# Patient Record
Sex: MALE | Race: OTHER | URBAN - METROPOLITAN AREA
[De-identification: names, ages, dates, MRNs, and addresses within clinical notes are randomized per-mention and may not be internally consistent; named-entity substitution may affect disease eponyms.]

---

## 2018-08-01 LAB
AVERAGE GLUCOSE: 120
CHOLESTEROL, TOTAL: 130 MG/DL
CHOLESTEROL/HDL RATIO: 3.6
HBA1C MFR BLD: 5.8 %
HDLC SERPL-MCNC: 36 MG/DL (ref 35–70)
LDL CHOLESTEROL CALCULATED: 74 MG/DL (ref 0–160)
TRIGL SERPL-MCNC: 99 MG/DL
VLDLC SERPL CALC-MCNC: 20 MG/DL

## 2018-08-02 LAB
ALBUMIN SERPL-MCNC: 4.2 G/DL
ALP BLD-CCNC: 104 U/L
ALT SERPL-CCNC: 11 U/L
ANION GAP SERPL CALCULATED.3IONS-SCNC: 13 MMOL/L
AST SERPL-CCNC: 17 U/L
BILIRUB SERPL-MCNC: 1.1 MG/DL (ref 0.1–1.4)
BUN BLDV-MCNC: 27 MG/DL
CALCIUM SERPL-MCNC: 9.6 MG/DL
CHLORIDE BLD-SCNC: 91 MMOL/L
CO2: NORMAL MMOL/L
CREAT SERPL-MCNC: 0.82 MG/DL
GFR CALCULATED: >60
GLUCOSE BLD-MCNC: 100 MG/DL
POTASSIUM SERPL-SCNC: 4.1 MMOL/L
SODIUM BLD-SCNC: 137 MMOL/L
TOTAL PROTEIN: 7.7

## 2018-08-06 ENCOUNTER — TELEPHONE (OUTPATIENT)
Dept: FAMILY MEDICINE CLINIC | Age: 58
End: 2018-08-06

## 2018-08-06 NOTE — TELEPHONE ENCOUNTER
PT WAS REFERRED BY [facility]-- [facility]       PT WAS HOSPITALIZED FOR 10 DAYS  WAS RELEASED 08/03/18 AND WOULD LIKE TO EST CARE WITH [facility] HE WOULD NEED TO BE SEEN IN 2 WKS IF ACCEPTED BY [facility] AS NEW PT  . PT HAS PULMONARY AND HEART ISSUES.         [facility]: [phone]

## 2018-08-06 NOTE — TELEPHONE ENCOUNTER
I HAVE NO IDEA WHY PATIENT WAS HOSPITALIZED AND AM UNABLE TO FIND RECORDS ON PATIENT OR ACCESS CARE EVERYWHERE SINCE ALL OF HIS INFORMATION IS IN HIS CHART. PATIENT REQUESTING APPT WITHIN 2 WEEKS TO ESTABLISH CARE AND ALSO FOR HOSPITAL FOLLOW UP.  PLEASE ADVISE IF OK TO SCHEDULE AND IF SO WHEN??

## 2018-08-28 ENCOUNTER — OFFICE VISIT (OUTPATIENT)
Dept: FAMILY MEDICINE CLINIC | Age: 58
End: 2018-08-28
Payer: COMMERCIAL

## 2018-08-28 VITALS
BODY MASS INDEX: 35.85 KG/M2 | DIASTOLIC BLOOD PRESSURE: 74 MMHG | WEIGHT: 270.5 LBS | HEART RATE: 72 BPM | RESPIRATION RATE: 16 BRPM | HEIGHT: 73 IN | TEMPERATURE: 97.7 F | SYSTOLIC BLOOD PRESSURE: 118 MMHG

## 2018-08-28 DIAGNOSIS — G47.33 OBSTRUCTIVE SLEEP APNEA: ICD-10-CM

## 2018-08-28 DIAGNOSIS — I27.20 PULMONARY HTN (HCC): ICD-10-CM

## 2018-08-28 DIAGNOSIS — J44.9 CHRONIC OBSTRUCTIVE PULMONARY DISEASE, UNSPECIFIED COPD TYPE (HCC): ICD-10-CM

## 2018-08-28 DIAGNOSIS — E78.2 MIXED HYPERLIPIDEMIA: ICD-10-CM

## 2018-08-28 DIAGNOSIS — I10 ESSENTIAL HYPERTENSION: Primary | ICD-10-CM

## 2018-08-28 DIAGNOSIS — Z11.59 NEED FOR HEPATITIS C SCREENING TEST: ICD-10-CM

## 2018-08-28 PROCEDURE — 1036F TOBACCO NON-USER: CPT | Performed by: FAMILY MEDICINE

## 2018-08-28 PROCEDURE — G8598 ASA/ANTIPLAT THER USED: HCPCS | Performed by: FAMILY MEDICINE

## 2018-08-28 PROCEDURE — 3017F COLORECTAL CA SCREEN DOC REV: CPT | Performed by: FAMILY MEDICINE

## 2018-08-28 PROCEDURE — G8417 CALC BMI ABV UP PARAM F/U: HCPCS | Performed by: FAMILY MEDICINE

## 2018-08-28 PROCEDURE — G8427 DOCREV CUR MEDS BY ELIG CLIN: HCPCS | Performed by: FAMILY MEDICINE

## 2018-08-28 PROCEDURE — 99203 OFFICE O/P NEW LOW 30 MIN: CPT | Performed by: FAMILY MEDICINE

## 2018-08-28 PROCEDURE — G8926 SPIRO NO PERF OR DOC: HCPCS | Performed by: FAMILY MEDICINE

## 2018-08-28 PROCEDURE — 3023F SPIROM DOC REV: CPT | Performed by: FAMILY MEDICINE

## 2018-08-28 RX ORDER — FUROSEMIDE 40 MG/1
40 TABLET ORAL DAILY
Qty: 90 TABLET | Refills: 1 | Status: SHIPPED | OUTPATIENT
Start: 2018-08-28 | End: 2019-02-27 | Stop reason: SDUPTHER

## 2018-08-28 RX ORDER — FUROSEMIDE 40 MG/1
40 TABLET ORAL DAILY
COMMUNITY
End: 2018-08-28 | Stop reason: SDUPTHER

## 2018-08-28 RX ORDER — CARVEDILOL 6.25 MG/1
6.25 TABLET ORAL 2 TIMES DAILY WITH MEALS
COMMUNITY

## 2018-08-28 RX ORDER — ALBUTEROL SULFATE 90 UG/1
2 AEROSOL, METERED RESPIRATORY (INHALATION) EVERY 6 HOURS PRN
Qty: 3 INHALER | Refills: 1 | Status: SHIPPED | OUTPATIENT
Start: 2018-08-28

## 2018-08-28 RX ORDER — ATORVASTATIN CALCIUM 80 MG/1
80 TABLET, FILM COATED ORAL EVERY EVENING
COMMUNITY

## 2018-08-28 RX ORDER — ALBUTEROL SULFATE 90 UG/1
2 AEROSOL, METERED RESPIRATORY (INHALATION) EVERY 6 HOURS PRN
COMMUNITY
End: 2018-08-28 | Stop reason: SDUPTHER

## 2018-08-28 ASSESSMENT — PATIENT HEALTH QUESTIONNAIRE - PHQ9
SUM OF ALL RESPONSES TO PHQ9 QUESTIONS 1 & 2: 0
1. LITTLE INTEREST OR PLEASURE IN DOING THINGS: 0
SUM OF ALL RESPONSES TO PHQ QUESTIONS 1-9: 0
SUM OF ALL RESPONSES TO PHQ QUESTIONS 1-9: 0
2. FEELING DOWN, DEPRESSED OR HOPELESS: 0

## 2018-08-28 NOTE — PROGRESS NOTES
Chief Complaint   Patient presents with   [facility].     new pt here to establish care     previous pcp dr salgado    Follow-Up from Hospital     f/u sj 7/25-7/30/18 and  7/30-8/3/18 CHF, copd, pulmonary htn    Testiclular Problem     c/o lump on right testicle for past 5 months      Patient presents today as a new patient here to establish care. Patients previous PCP was Dr. Melissa Pritchett    Patient also presents today for FU on recent hospitalization for acute respiratory failure secondary to CHF, COPD, and Pulmonary Hypertension at [facility] 7/25/18-7/30/18 and than he was transferred to [facility] 7/30-8/3/18. his medication was adjusted during the stay. He was seen by Dr The Mosaic Jules, Dr. Payal Goodwin, Dr. Park Clark, Dr. Chiara Ellis  he reports that condition has resolved  Cont symptoms include none  Lab and imaging test obtained include blood tests, CT scan, echocardiogram, x-rays  All test results were obtained and reviewed today. He was discharged home on home oxygen. He was also discharged home on BiPAP which he has been tolerating well at home. His symptoms have improved since he was discharged with improvement in his dyspnea and minimal coughing and no chest pain. No PND or orthopnea. He has not been coughing and has wheezes occasionally. He also presents c/o of Right  testicle mass. Symptoms began 5  months ago. Onset was sudden and states it has been increasing in size Pain is not present. Trauma to the area has not occured.     Past Medical History:   Diagnosis Date    Asthma     CAD (coronary artery disease)     CHF (congestive heart failure) (HCC)     Colon polyp     COPD (chronic obstructive pulmonary disease) (HCC)     Diverticulosis of sigmoid colon     Gastric ulcer with hemorrhage     Heart attack ([address]     HTN (hypertension)     Hyperlipidemia     Obstructive sleep apnea     uses bi-pap    [facility]monary HTN ([facility] [address] [facility]      Patient Active Problem List    Diagnosis Date Noted    COPD (chronic obstructive the lungs every 6 hours as needed for Wheezing 3 Inhaler 1    tiotropium (SPIRIVA HANDIHALER) 18 MCG inhalation capsule Inhale 1 capsule into the lungs daily 90 capsule 1    [DISCONTINUED] furosemide (LASIX) 40 MG tablet Take 40 mg by mouth daily      [DISCONTINUED] albuterol sulfate HFA (PROAIR HFA) 108 (90 Base) MCG/ACT inhaler Inhale 2 puffs into the lungs every 6 hours as needed for Wheezing       No facility-administered encounter medications on file as of 8/28/2018. Orders Placed This Encounter   Procedures    Comprehensive Metabolic Panel     Standing Status:   Future     Standing Expiration Date:   8/28/2019    Lipid Panel     Standing Status:   Future     Standing Expiration Date:   8/28/2019     Order Specific Question:   Is Patient Fasting?/# of Hours     Answer:   8-10HRS    CBC Auto Differential     Standing Status:   Future     Standing Expiration Date:   8/28/2019    Hepatitis C Antibody     Standing Status:   Future     Standing Expiration Date:   8/28/2019    Lipid Panel     This order was created through External Result Entry    Comprehensive Metabolic Panel     This order was created through External Result Entry    Hemoglobin A1C     This order was created through External Result Entry       Natural history and expected course discussed. Questions answered. Educational materials distributed. They understand and agree with this course of treatment. They will return with new or worsening symptoms. Patient instructed to remain current with appropriate annual health maintenance.    Follow up if persistent or worsening symptoms otherwise follow up after test result and prn

## 2018-08-28 NOTE — PATIENT INSTRUCTIONS
· Eat foods that contain protein so that you do not lose muscle mass.     · Talk with your doctor if you gain too much weight or if you lose weight without trying.    Mental health    · Talk to your family, friends, or a therapist about your feelings. It is normal to feel frightened, angry, hopeless, helpless, and even guilty. Talking openly about bad feelings can help you cope. If these feelings last, talk to your doctor. When should you call for help? Call 911 anytime you think you may need emergency care. For example, call if:    · You have severe trouble breathing.    Call your doctor now or seek immediate medical care if:    · You have new or worse trouble breathing.     · You cough up blood.     · You have a fever.    Watch closely for changes in your health, and be sure to contact your doctor if:    · You cough more deeply or more often, especially if you notice more mucus or a change in the color of your mucus.     · You have new or worse swelling in your legs or belly.     · You are not getting better as expected. Where can you learn more? Go to [email or web address] org and sign in to your [employer] account. Enter J323 in the xMatters box to learn more about \"Chronic Obstructive Pulmonary Disease (COPD): Care Instructions. \"     If you do not have an account, please click on the \"Sign Up Now\" link. Current as of: December 6, 2017  Content Version: 11.7  © 6835-4262 [employer]. Care instructions adapted under license by [facility] ([facility]). If you have questions about a medical condition or this instruction, always ask your healthcare professional. [address] any warranty or liability for your use of this information.

## 2018-08-28 NOTE — LETTER
[facility]  [address] [address]  Phone: [phone]  Fax: [phone]    Sue Galindo MD        2018     Patient: Jose Adkins   YOB: 1960   Date of Visit: 2018       To Whom It May Concern: It is my medical opinion that Jose Adkins requires a disability parking placard for the following reasons:  He cannot walk 200 feet without stopping to rest.  Duration of need: 5 years to  on 23    If you have any questions or concerns, please don't hesitate to call.     Sincerely,        Sue Galindo MD

## 2018-08-29 DIAGNOSIS — E78.2 MIXED HYPERLIPIDEMIA: ICD-10-CM

## 2018-08-29 DIAGNOSIS — Z11.59 NEED FOR HEPATITIS C SCREENING TEST: ICD-10-CM

## 2018-08-29 DIAGNOSIS — I10 ESSENTIAL HYPERTENSION: ICD-10-CM

## 2018-08-29 LAB
ALBUMIN SERPL-MCNC: 4.3 G/DL (ref 3.9–4.9)
ALP BLD-CCNC: 107 U/L (ref 35–104)
ALT SERPL-CCNC: 15 U/L (ref 0–41)
ANION GAP SERPL CALCULATED.3IONS-SCNC: 16 MEQ/L (ref 7–13)
AST SERPL-CCNC: 22 U/L (ref 0–40)
BASOPHILS ABSOLUTE: 0.1 K/UL (ref 0–0.2)
BASOPHILS RELATIVE PERCENT: 0.9 %
BILIRUB SERPL-MCNC: 0.8 MG/DL (ref 0–1.2)
BUN BLDV-MCNC: 12 MG/DL (ref 6–20)
CALCIUM SERPL-MCNC: 9.4 MG/DL (ref 8.6–10.2)
CHLORIDE BLD-SCNC: 94 MEQ/L (ref 98–107)
CHOLESTEROL, TOTAL: 149 MG/DL (ref 0–199)
CO2: 32 MEQ/L (ref 22–29)
CREAT SERPL-MCNC: 0.7 MG/DL (ref 0.7–1.2)
EOSINOPHILS ABSOLUTE: 0.6 K/UL (ref 0–0.7)
EOSINOPHILS RELATIVE PERCENT: 6.8 %
GFR AFRICAN AMERICAN: >60
GFR NON-AFRICAN AMERICAN: >60
GLOBULIN: 3.2 G/DL (ref 2.3–3.5)
GLUCOSE BLD-MCNC: 101 MG/DL (ref 74–109)
HCT VFR BLD CALC: 47.4 % (ref 42–52)
HDLC SERPL-MCNC: 41 MG/DL (ref 40–59)
HEMOGLOBIN: 15.7 G/DL (ref 14–18)
HEPATITIS C ANTIBODY INTERPRETATION: NORMAL
LDL CHOLESTEROL CALCULATED: 83 MG/DL (ref 0–129)
LYMPHOCYTES ABSOLUTE: 1.9 K/UL (ref 1–4.8)
LYMPHOCYTES RELATIVE PERCENT: 24.1 %
MCH RBC QN AUTO: 28.5 PG (ref 27–31.3)
MCHC RBC AUTO-ENTMCNC: 33.2 % (ref 33–37)
MCV RBC AUTO: 85.9 FL (ref 80–100)
MONOCYTES ABSOLUTE: 0.6 K/UL (ref 0.2–0.8)
MONOCYTES RELATIVE PERCENT: 7.8 %
NEUTROPHILS ABSOLUTE: 4.9 K/UL (ref 1.4–6.5)
NEUTROPHILS RELATIVE PERCENT: 60.4 %
PDW BLD-RTO: 14.7 % (ref 11.5–14.5)
PLATELET # BLD: 172 K/UL (ref 130–400)
POTASSIUM SERPL-SCNC: 3.6 MEQ/L (ref 3.5–5.1)
RBC # BLD: 5.52 M/UL (ref 4.7–6.1)
SODIUM BLD-SCNC: 142 MEQ/L (ref 132–144)
TOTAL PROTEIN: 7.5 G/DL (ref 6.4–8.1)
TRIGL SERPL-MCNC: 127 MG/DL (ref 0–200)
WBC # BLD: 8.1 K/UL (ref 4.8–10.8)

## 2018-10-05 ENCOUNTER — OFFICE VISIT (OUTPATIENT)
Dept: FAMILY MEDICINE CLINIC | Age: 58
End: 2018-10-05
Payer: COMMERCIAL

## 2018-10-05 VITALS
HEART RATE: 80 BPM | BODY MASS INDEX: 35.41 KG/M2 | SYSTOLIC BLOOD PRESSURE: 118 MMHG | RESPIRATION RATE: 20 BRPM | DIASTOLIC BLOOD PRESSURE: 60 MMHG | HEIGHT: 73 IN | WEIGHT: 267.2 LBS | TEMPERATURE: 97.5 F

## 2018-10-05 DIAGNOSIS — L72.3 SEBACEOUS CYST OF SCROTUM: ICD-10-CM

## 2018-10-05 DIAGNOSIS — J44.9 CHRONIC OBSTRUCTIVE PULMONARY DISEASE, UNSPECIFIED COPD TYPE (HCC): ICD-10-CM

## 2018-10-05 DIAGNOSIS — R20.8 OTHER DISTURBANCES OF SKIN SENSATION: ICD-10-CM

## 2018-10-05 DIAGNOSIS — Z23 NEED FOR INFLUENZA VACCINATION: ICD-10-CM

## 2018-10-05 DIAGNOSIS — G47.33 OSA TREATED WITH BIPAP: ICD-10-CM

## 2018-10-05 DIAGNOSIS — Z23 NEED FOR STREPTOCOCCUS PNEUMONIAE VACCINATION: ICD-10-CM

## 2018-10-05 DIAGNOSIS — I10 ESSENTIAL HYPERTENSION: Primary | ICD-10-CM

## 2018-10-05 PROCEDURE — 90688 IIV4 VACCINE SPLT 0.5 ML IM: CPT | Performed by: FAMILY MEDICINE

## 2018-10-05 PROCEDURE — 90472 IMMUNIZATION ADMIN EACH ADD: CPT | Performed by: FAMILY MEDICINE

## 2018-10-05 PROCEDURE — G8427 DOCREV CUR MEDS BY ELIG CLIN: HCPCS | Performed by: FAMILY MEDICINE

## 2018-10-05 PROCEDURE — 99213 OFFICE O/P EST LOW 20 MIN: CPT | Performed by: FAMILY MEDICINE

## 2018-10-05 PROCEDURE — 3017F COLORECTAL CA SCREEN DOC REV: CPT | Performed by: FAMILY MEDICINE

## 2018-10-05 PROCEDURE — 11100 PR BIOPSY OF SKIN LESION: CPT | Performed by: FAMILY MEDICINE

## 2018-10-05 PROCEDURE — 90471 IMMUNIZATION ADMIN: CPT | Performed by: FAMILY MEDICINE

## 2018-10-05 PROCEDURE — G8598 ASA/ANTIPLAT THER USED: HCPCS | Performed by: FAMILY MEDICINE

## 2018-10-05 PROCEDURE — 90732 PPSV23 VACC 2 YRS+ SUBQ/IM: CPT | Performed by: FAMILY MEDICINE

## 2018-10-05 PROCEDURE — 3023F SPIROM DOC REV: CPT | Performed by: FAMILY MEDICINE

## 2018-10-05 PROCEDURE — G8482 FLU IMMUNIZE ORDER/ADMIN: HCPCS | Performed by: FAMILY MEDICINE

## 2018-10-05 PROCEDURE — 1036F TOBACCO NON-USER: CPT | Performed by: FAMILY MEDICINE

## 2018-10-05 PROCEDURE — G8417 CALC BMI ABV UP PARAM F/U: HCPCS | Performed by: FAMILY MEDICINE

## 2018-10-05 PROCEDURE — G8926 SPIRO NO PERF OR DOC: HCPCS | Performed by: FAMILY MEDICINE

## 2018-10-05 RX ORDER — POTASSIUM CHLORIDE 750 MG/1
10 TABLET, FILM COATED, EXTENDED RELEASE ORAL DAILY
Qty: 90 TABLET | Refills: 1 | Status: SHIPPED | OUTPATIENT
Start: 2018-10-05

## 2018-10-05 NOTE — PROGRESS NOTES
Subjective:      Amie Tovar is a 60 y.o. male who presents today for FU of Mass (C/O SMALL LUMP ON RIGHT TESTICLE FOR PAST YEAR) and Follow-up (Chronic medical conditions)    He is here to follow up on his prior visit on his chronic medical condition. He reports that he had seen the specialist who informed him that he did not have pulmonary hypertension. He has since been following up with this regular pulmonologist and cardiologist.  He has felt a lot better since he has been on BiPAP for his sleep apnea. His symptoms have completely resolved and is able to function better. The patient denies cough, chest pain, dyspnea, wheezing or hemoptysis. Patient denies any exertional chest pain, dyspnea, palpitations, syncope, orthopnea, edema or paroxysmal nocturnal dyspnea. He presents c/o of Right testicle mass. Symptoms began 1 year ago. It has been constant and has increased in size Pain is not present. . Associated symptoms include none. Trauma to the area has not occured.     Past Medical History:   Diagnosis Date    Asthma     CAD (coronary artery disease)     CHF (congestive heart failure) (HCC)     Colon polyp     COPD (chronic obstructive pulmonary disease) (HCC)     Diverticulosis of sigmoid colon     Gastric ulcer with hemorrhage     Heart attack ([address]     HTN (hypertension)     Hyperlipidemia     Obstructive sleep apnea     uses bi-pap    Pulmonary HTN [address] [address]      Patient Active Problem List    Diagnosis Date Noted    COPD (chronic obstructive pulmonary disease) ([facility] [address]     CHF (congestive heart failure) (HCC)     Asthma     MARIAN treated with BiPAP     HTN (hypertension)     Hyperlipidemia     CAD (coronary artery disease)      Past Surgical History:   Procedure Laterality Date    COLONOSCOPY  2017    Dr. Lance Pires  2013    1 stent    CORONARY ANGIOPLASTY WITH STENT PLACEMENT  2014    2 stents    CYST REMOVAL  2014    back    CYST REMOVAL MG tablet Take 80 mg by mouth every evening      furosemide (LASIX) 40 MG tablet Take 1 tablet by mouth daily 90 tablet 1    albuterol sulfate HFA (PROAIR HFA) 108 (90 Base) MCG/ACT inhaler Inhale 2 puffs into the lungs every 6 hours as needed for Wheezing 3 Inhaler 1    tiotropium (SPIRIVA HANDIHALER) 18 MCG inhalation capsule Inhale 1 capsule into the lungs daily 90 capsule 1    OXYGEN 2l continuous oxygen       No current facility-administered medications on file prior to visit. No Known Allergies     Review of Systems - General ROS: negative for - fatigue, fever, malaise, night sweats, sleep disturbance or weight loss  Psychological ROS: negative for - anxiety, concentration difficulties, depression, memory difficulties or sleep disturbances  ENT ROS: negative for - hearing change, nasal discharge, oral lesions, sinus pain, sore throat, tinnitus or vertigo  Allergy and Immunology ROS: negative for - hives, nasal congestion or seasonal allergies  Hematological and Lymphatic ROS: negative for - bruising, fatigue, night sweats or pallor  Endocrine ROS: negative for - hot flashes, malaise/lethargy, palpitations, polydipsia/polyuria, skin changes, temperature intolerance or unexpected weight changes  Respiratory ROS: negative for - cough, hemoptysis, pleuritic pain, shortness of breath or wheezing  Cardiovascular ROS: no chest pain or dyspnea on exertion  Gastrointestinal ROS: no abdominal pain, change in bowel habits, or black or bloody stools  Genito-Urinary ROS: no dysuria, trouble voiding, or hematuria  Musculoskeletal ROS: negative for - joint pain, joint stiffness, joint swelling, muscle pain or muscular weakness  Neurological ROS: negative for - dizziness, gait disturbance, headaches, impaired coordination/balance, numbness/tingling, tremors or visual changes       Blood pressure 118/60, pulse 80, temperature 97.5 °F (36.4 °C), temperature source Temporal, resp.  rate 20, height 6' 1\" (1.854 m), weight 1.2 mg/dL Final    Alkaline Phosphatase 08/29/2018 107* 35 - 104 U/L Final    ALT 08/29/2018 15  0 - 41 U/L Final    AST 08/29/2018 22  0 - 40 U/L Final    Globulin 08/29/2018 3.2  2.3 - 3.5 g/dL Final    Cholesterol, Total 08/29/2018 149  0 - 199 mg/dL Final    ATP III Cholesterol classification is Desirable.  Triglycerides 08/29/2018 127  0 - 200 mg/dL Final    ATP III Triglycerides Classification is Normal.    HDL 08/29/2018 41  40 - 59 mg/dL Final    Comment: ATP III HDL Cholesterol Classification is Desirable. Expected Values:    Males:    >55 = No Risk            35-55 = Moderate Risk            <35 = High Risk    Females:  >65 = No Risk            45-65 = Moderate Risk            <45 = High Risk    NCEP Guidelines:   Third Report May 2001  >59 = negative risk factor for CHD  <40 = major risk factor for CHD      LDL Calculated 08/29/2018 83  0 - 129 mg/dL Final    ATP III LDL Classification is Optimal.    WBC 08/29/2018 8.1  4.8 - 10.8 K/uL Final    RBC 08/29/2018 5.52  4.70 - 6.10 M/uL Final    Hemoglobin 08/29/2018 15.7  14.0 - 18.0 g/dL Final    Hematocrit 08/29/2018 47.4  42.0 - 52.0 % Final    MCV 08/29/2018 85.9  80.0 - 100.0 fL Final    MCH 08/29/2018 28.5  27.0 - 31.3 pg Final    MCHC 08/29/2018 33.2  33.0 - 37.0 % Final    RDW 08/29/2018 14.7* 11.5 - 14.5 % Final    Platelets 90/70/1283 172  130 - 400 K/uL Final    Neutrophils % 08/29/2018 60.4  % Final    Lymphocytes % 08/29/2018 24.1  % Final    Monocytes % 08/29/2018 7.8  % Final    Eosinophils % 08/29/2018 6.8  % Final    Basophils % 08/29/2018 0.9  % Final    Neutrophils # 08/29/2018 4.9  1.4 - 6.5 K/uL Final    Lymphocytes # 08/29/2018 1.9  1.0 - 4.8 K/uL Final    Monocytes # 08/29/2018 0.6  0.2 - 0.8 K/uL Final    Eosinophils # 08/29/2018 0.6  0.0 - 0.7 K/uL Final    Basophils # 08/29/2018 0.1  0.0 - 0.2 K/uL Final    Hep C Ab Interp 08/29/2018 Non-reactive   Final       Test results reviewed and discussed with the patient     Assessment / Plan:     Encounter Diagnoses   Name Primary?  Need for influenza vaccination     MARIAN treated with BiPAP     Essential hypertension Yes    Chronic obstructive pulmonary disease, unspecified COPD type ([facility] [address]     Need for Streptococcus pneumoniae vaccination     Sebaceous cyst of scrotum     Other disturbances of skin sensation        Outpatient Encounter Prescriptions as of 10/5/2018   Medication Sig Dispense Refill    potassium chloride (K-TAB) 10 MEQ extended release tablet Take 1 tablet by mouth daily 90 tablet 1    mometasone-formoterol (DULERA) 100-5 MCG/ACT inhaler Inhale 2 puffs into the lungs 2 times daily      aspirin 81 MG tablet Take 81 mg by mouth daily      carvedilol (COREG) 6.25 MG tablet Take 6.25 mg by mouth 2 times daily (with meals)      atorvastatin (LIPITOR) 80 MG tablet Take 80 mg by mouth every evening      furosemide (LASIX) 40 MG tablet Take 1 tablet by mouth daily 90 tablet 1    albuterol sulfate HFA (PROAIR HFA) 108 (90 Base) MCG/ACT inhaler Inhale 2 puffs into the lungs every 6 hours as needed for Wheezing 3 Inhaler 1    tiotropium (SPIRIVA HANDIHALER) 18 MCG inhalation capsule Inhale 1 capsule into the lungs daily 90 capsule 1    OXYGEN 2l continuous oxygen      [DISCONTINUED] tiotropium (SPIRIVA HANDIHALER) 18 MCG inhalation capsule Inhale 18 mcg into the lungs daily       No facility-administered encounter medications on file as of 10/5/2018.         Orders Placed This Encounter   Procedures    INFLUENZA, QUADV, 3 YRS AND OLDER, IM, MDV, 0.5ML (FLUZONE QUADV)    Pneumococcal polysaccharide vaccine 23-valent greater than or equal to 1yo subcutaneous/IM     [id number]    Surgical Pathology     Standing Status:   Future     Standing Expiration Date:   10/5/2019     Order Specific Question:   PREVIOUS BIOPSY     Answer:   No     Order Specific Question:   PREOP DIAGNOSIS     Answer:   epidermoid cyst scrotum     Order Specific Question:   FROZEN

## 2018-10-06 DIAGNOSIS — L72.3 SEBACEOUS CYST OF SCROTUM: ICD-10-CM

## 2018-10-11 ENCOUNTER — NURSE ONLY (OUTPATIENT)
Dept: FAMILY MEDICINE CLINIC | Age: 58
End: 2018-10-11

## 2018-10-11 DIAGNOSIS — L72.3 SEBACEOUS CYST OF SCROTUM: Primary | ICD-10-CM

## 2018-12-01 RX ORDER — FUROSEMIDE 40 MG/1
40 TABLET ORAL DAILY
Qty: 90 TABLET | Refills: 1 | OUTPATIENT
Start: 2018-12-01

## 2018-12-04 NOTE — TELEPHONE ENCOUNTER
From: Froy Smalls  Sent: 12/4/2018 7:02 AM EST  Subject: Medication Renewal Request    Froy Smalls would like a refill of the following medications:     tiotropium (SPIRIVA RESPIMAT) 1.25 MCG/ACT AERS inhaler Tim Chris MD]    Preferred pharmacy: [address] [place] [address] , [address] [facility] [phone] Rico Benitez [phone]    Comment:

## 2019-03-19 ENCOUNTER — TELEPHONE (OUTPATIENT)
Dept: ADMINISTRATIVE | Age: 59
End: 2019-03-19

## 2023-03-07 ENCOUNTER — OFFICE VISIT (OUTPATIENT)
Dept: PRIMARY CARE | Facility: CLINIC | Age: 63
End: 2023-03-07
Payer: COMMERCIAL

## 2023-03-07 VITALS
WEIGHT: 293 LBS | TEMPERATURE: 97.6 F | DIASTOLIC BLOOD PRESSURE: 70 MMHG | BODY MASS INDEX: 39.68 KG/M2 | HEART RATE: 104 BPM | SYSTOLIC BLOOD PRESSURE: 140 MMHG | OXYGEN SATURATION: 94 % | HEIGHT: 72 IN | RESPIRATION RATE: 16 BRPM

## 2023-03-07 DIAGNOSIS — R05.9 COUGH IN ADULT: Primary | ICD-10-CM

## 2023-03-07 DIAGNOSIS — R05.9 COUGH: ICD-10-CM

## 2023-03-07 PROCEDURE — 99213 OFFICE O/P EST LOW 20 MIN: CPT | Performed by: NURSE PRACTITIONER

## 2023-03-07 PROCEDURE — 1036F TOBACCO NON-USER: CPT | Performed by: NURSE PRACTITIONER

## 2023-03-07 RX ORDER — AMOXICILLIN 500 MG/1
500 CAPSULE ORAL 3 TIMES DAILY
Qty: 21 CAPSULE | Refills: 0 | Status: SHIPPED | OUTPATIENT
Start: 2023-03-07 | End: 2023-03-14

## 2023-03-07 RX ORDER — FUROSEMIDE 40 MG/1
1 TABLET ORAL DAILY
COMMUNITY
Start: 2018-09-24 | End: 2023-07-31

## 2023-03-07 RX ORDER — MULTIVIT WITH IRON,MINERALS
TABLET,CHEWABLE ORAL
COMMUNITY
End: 2024-05-14 | Stop reason: ALTCHOICE

## 2023-03-07 RX ORDER — CARVEDILOL 6.25 MG/1
TABLET ORAL
COMMUNITY
Start: 2014-06-07 | End: 2024-02-08 | Stop reason: SDUPTHER

## 2023-03-07 RX ORDER — ATORVASTATIN CALCIUM 80 MG/1
1 TABLET, FILM COATED ORAL DAILY
COMMUNITY
Start: 2013-09-07 | End: 2024-03-18 | Stop reason: SDUPTHER

## 2023-03-07 RX ORDER — NITROGLYCERIN 0.4 MG/1
TABLET SUBLINGUAL
COMMUNITY
Start: 2013-08-21

## 2023-03-07 RX ORDER — MOMETASONE FUROATE AND FORMOTEROL FUMARATE DIHYDRATE 100; 5 UG/1; UG/1
2 AEROSOL RESPIRATORY (INHALATION) 2 TIMES DAILY
COMMUNITY
Start: 2015-02-20 | End: 2024-03-15 | Stop reason: SDUPTHER

## 2023-03-07 RX ORDER — FLUTICASONE PROPIONATE 50 MCG
1 SPRAY, SUSPENSION (ML) NASAL 2 TIMES DAILY
COMMUNITY
End: 2023-09-03

## 2023-03-07 RX ORDER — POTASSIUM CHLORIDE 750 MG/1
1 TABLET, EXTENDED RELEASE ORAL DAILY
COMMUNITY
Start: 2019-04-08 | End: 2023-05-31

## 2023-03-07 RX ORDER — TIOTROPIUM BROMIDE INHALATION SPRAY 1.56 UG/1
SPRAY, METERED RESPIRATORY (INHALATION)
COMMUNITY
Start: 2018-09-24 | End: 2023-05-31

## 2023-03-07 ASSESSMENT — PATIENT HEALTH QUESTIONNAIRE - PHQ9
1. LITTLE INTEREST OR PLEASURE IN DOING THINGS: NOT AT ALL
SUM OF ALL RESPONSES TO PHQ9 QUESTIONS 1 AND 2: 0
2. FEELING DOWN, DEPRESSED OR HOPELESS: NOT AT ALL

## 2023-03-07 ASSESSMENT — ENCOUNTER SYMPTOMS
SORE THROAT: 0
HEADACHES: 1
COUGH: 1

## 2023-03-07 NOTE — PATIENT INSTRUCTIONS
Patient was seen and examined.  Diagnosis, treatment, and possible complications of today's illness discussed and explained to patient.  Patient to take medications associated with this visit.   Advised to increase oral fluid intake as tolerated if no nausea or vomiting.  Patient educated and advised to come back if worsening or persistent symptoms. Patient educated on when to seek urgent/emergent care. Patient was given opportunity to ask questions and denied any at this time.  Patient verbalized understanding and agrees with plan of care.

## 2023-03-07 NOTE — PROGRESS NOTES
Subjective   Patient ID: Simba [facility] is a 62 y.o. male who presents for URI.  Patient presents to office with cough, congestion, post nasal drainage.  Patient reports he has taken tylenol, mucinex.  Patient reports symptoms began yesterday.  No covid test taken.  Patient denies fever, shortness of breath, chest pain, nausea, vomiting nor diarrhea.      URI   This is a new problem. The current episode started yesterday. The problem has been gradually worsening. There has been no fever. Associated symptoms include congestion, coughing, headaches and sneezing. Pertinent negatives include no chest pain or sore throat. He has tried acetaminophen and decongestant for the symptoms. The treatment provided mild relief.       Review of Systems   HENT:  Positive for congestion and sneezing. Negative for sore throat.    Respiratory:  Positive for cough.    Cardiovascular:  Negative for chest pain.   Neurological:  Positive for headaches.   All other systems reviewed and are negative.      /70 (BP Location: Left arm, Patient Position: Sitting)   Pulse 104   Temp 36.4 °C (97.6 °F)   Resp 16   Ht 1.829 m (6')   Wt 133 kg (293 lb)   SpO2 94%   BMI 39.74 kg/m²     Objective   Physical Exam  Vitals and nursing note reviewed.   Constitutional:       Appearance: Normal appearance.   HENT:      Head: Normocephalic and atraumatic.      Right Ear: Tympanic membrane, ear canal and external ear normal.      Left Ear: Tympanic membrane, ear canal and external ear normal.      Nose: Congestion and rhinorrhea present.      Mouth/Throat:      Mouth: Mucous membranes are moist.      Pharynx: Oropharynx is clear.   Eyes:      Extraocular Movements: Extraocular movements intact.      Conjunctiva/sclera: Conjunctivae normal.      Pupils: Pupils are equal, round, and reactive to light.   Cardiovascular:      Rate and Rhythm: Normal rate and regular rhythm.      Pulses: Normal pulses.      Heart sounds: Normal heart sounds.    Pulmonary:      Effort: Pulmonary effort is normal. No respiratory distress.      Breath sounds: No stridor. Rhonchi present.      Comments: Rhonchi RUL cleared with cough.   Chest:      Chest wall: No tenderness.   Musculoskeletal:         General: No swelling, deformity or signs of injury. Normal range of motion.      Cervical back: Normal range of motion and neck supple.   Skin:     General: Skin is warm and dry.      Capillary Refill: Capillary refill takes less than 2 seconds.   Neurological:      General: No focal deficit present.      Mental Status: He is alert and oriented to person, place, and time.   Psychiatric:         Mood and Affect: Mood normal.         Behavior: Behavior normal.         Thought Content: Thought content normal.         Judgment: Judgment normal.         Assessment/Plan   Problem List Items Addressed This Visit    None

## 2023-03-07 NOTE — PROGRESS NOTES
Chief Complaint: Possible URI  Symptoms:  Cough, congestion, yellow phlegm, post nasal drip, hoarseness, rattling in lungs, dull headaches, neck pain, body aches, and fatigued  Length of Symptoms: 2 days   Denies:  OTC medications: Mucinex D, and Tylenol   Related information:

## 2023-05-29 DIAGNOSIS — J45.909 ASTHMA, UNSPECIFIED ASTHMA SEVERITY, UNSPECIFIED WHETHER COMPLICATED, UNSPECIFIED WHETHER PERSISTENT (HHS-HCC): ICD-10-CM

## 2023-05-29 DIAGNOSIS — E87.6 HYPOKALEMIA: ICD-10-CM

## 2023-05-31 PROBLEM — G47.33 OBSTRUCTIVE SLEEP APNEA OF ADULT: Status: ACTIVE | Noted: 2023-05-31

## 2023-05-31 PROBLEM — R42 DIZZINESS: Status: ACTIVE | Noted: 2023-05-31

## 2023-05-31 PROBLEM — I73.9 CLAUDICATION (CMS-HCC): Status: ACTIVE | Noted: 2023-05-31

## 2023-05-31 PROBLEM — I10 ESSENTIAL HYPERTENSION: Status: ACTIVE | Noted: 2023-05-31

## 2023-05-31 PROBLEM — U07.1 COVID-19 VIRUS INFECTION: Status: RESOLVED | Noted: 2023-05-31 | Resolved: 2023-05-31

## 2023-05-31 PROBLEM — N39.0 RECURRENT UTI: Status: RESOLVED | Noted: 2023-05-31 | Resolved: 2023-05-31

## 2023-05-31 PROBLEM — H81.10 BPPV (BENIGN PAROXYSMAL POSITIONAL VERTIGO): Status: ACTIVE | Noted: 2023-05-31

## 2023-05-31 PROBLEM — F17.211 CIGARETTE NICOTINE DEPENDENCE IN REMISSION: Status: ACTIVE | Noted: 2023-05-31

## 2023-05-31 PROBLEM — J45.909 ASTHMA (HHS-HCC): Status: ACTIVE | Noted: 2023-05-31

## 2023-05-31 PROBLEM — J44.9 COPD (CHRONIC OBSTRUCTIVE PULMONARY DISEASE) (MULTI): Status: ACTIVE | Noted: 2023-05-31

## 2023-05-31 PROBLEM — I25.10 ATHEROSCLEROTIC HEART DISEASE OF NATIVE CORONARY ARTERY WITHOUT ANGINA PECTORIS: Status: ACTIVE | Noted: 2023-05-31

## 2023-05-31 PROBLEM — Z98.61 CAD S/P PERCUTANEOUS CORONARY ANGIOPLASTY: Status: ACTIVE | Noted: 2023-05-31

## 2023-05-31 PROBLEM — R07.9 CHEST PAIN: Status: ACTIVE | Noted: 2023-05-31

## 2023-05-31 PROBLEM — M25.562 CHRONIC PAIN OF LEFT KNEE: Status: ACTIVE | Noted: 2023-05-31

## 2023-05-31 PROBLEM — E55.9 VITAMIN D DEFICIENCY: Status: ACTIVE | Noted: 2023-05-31

## 2023-05-31 PROBLEM — E87.6 HYPOKALEMIA: Status: ACTIVE | Noted: 2023-05-31

## 2023-05-31 PROBLEM — H81.09 MENIERE DISEASE: Status: ACTIVE | Noted: 2023-05-31

## 2023-05-31 PROBLEM — R09.81 SINUS CONGESTION: Status: RESOLVED | Noted: 2023-05-31 | Resolved: 2023-05-31

## 2023-05-31 PROBLEM — M17.0 PRIMARY LOCALIZED OSTEOARTHRITIS OF KNEES, BILATERAL: Status: ACTIVE | Noted: 2023-05-31

## 2023-05-31 PROBLEM — D64.9 ANEMIA: Status: ACTIVE | Noted: 2023-05-31

## 2023-05-31 PROBLEM — R42 VERTIGO: Status: ACTIVE | Noted: 2023-05-31

## 2023-05-31 PROBLEM — I25.10 CAD S/P PERCUTANEOUS CORONARY ANGIOPLASTY: Status: ACTIVE | Noted: 2023-05-31

## 2023-05-31 PROBLEM — I73.9 PAD (PERIPHERAL ARTERY DISEASE) (CMS-HCC): Status: ACTIVE | Noted: 2023-05-31

## 2023-05-31 PROBLEM — R60.9 EDEMA: Status: ACTIVE | Noted: 2023-05-31

## 2023-05-31 PROBLEM — I20.89 EFFORT ANGINA (CMS-HCC): Status: ACTIVE | Noted: 2023-05-31

## 2023-05-31 PROBLEM — G89.29 CHRONIC PAIN OF LEFT KNEE: Status: ACTIVE | Noted: 2023-05-31

## 2023-05-31 PROBLEM — J06.9 UPPER RESPIRATORY TRACT INFECTION: Status: RESOLVED | Noted: 2023-05-31 | Resolved: 2023-05-31

## 2023-05-31 PROBLEM — E78.2 MIXED HYPERLIPIDEMIA: Status: ACTIVE | Noted: 2023-05-31

## 2023-05-31 PROBLEM — D72.829 ELEVATED WBC COUNT: Status: ACTIVE | Noted: 2023-05-31

## 2023-05-31 PROBLEM — N39.0 ACUTE UTI: Status: RESOLVED | Noted: 2023-05-31 | Resolved: 2023-05-31

## 2023-05-31 PROBLEM — R35.0 URINARY FREQUENCY: Status: ACTIVE | Noted: 2023-05-31

## 2023-05-31 PROBLEM — N50.89 MASS OF SCROTUM: Status: ACTIVE | Noted: 2023-05-31

## 2023-05-31 PROBLEM — K25.3 ANTRAL ULCER, ACUTE: Status: ACTIVE | Noted: 2023-05-31

## 2023-05-31 RX ORDER — TIOTROPIUM BROMIDE INHALATION SPRAY 1.56 UG/1
SPRAY, METERED RESPIRATORY (INHALATION)
Qty: 12 G | Refills: 0 | Status: SHIPPED | OUTPATIENT
Start: 2023-05-31 | End: 2024-03-12 | Stop reason: SDUPTHER

## 2023-05-31 RX ORDER — POTASSIUM CHLORIDE 750 MG/1
TABLET, EXTENDED RELEASE ORAL
Qty: 90 TABLET | Refills: 0 | Status: SHIPPED | OUTPATIENT
Start: 2023-05-31 | End: 2023-10-16 | Stop reason: SDUPTHER

## 2023-05-31 NOTE — TELEPHONE ENCOUNTER
Patient is scheduled 7/3/23 due to his vacation days, work schedule, patient is asking for short term medication to be sent until he can be seen. Please contact patient when medication is sent

## 2023-06-08 ENCOUNTER — TELEPHONE (OUTPATIENT)
Dept: PRIMARY CARE | Facility: CLINIC | Age: 63
End: 2023-06-08

## 2023-06-08 ENCOUNTER — OFFICE VISIT (OUTPATIENT)
Dept: PRIMARY CARE | Facility: CLINIC | Age: 63
End: 2023-06-08
Payer: COMMERCIAL

## 2023-06-08 ENCOUNTER — LAB (OUTPATIENT)
Dept: LAB | Facility: LAB | Age: 63
End: 2023-06-08
Payer: COMMERCIAL

## 2023-06-08 VITALS
HEIGHT: 72 IN | OXYGEN SATURATION: 98 % | TEMPERATURE: 97.8 F | BODY MASS INDEX: 39.87 KG/M2 | SYSTOLIC BLOOD PRESSURE: 112 MMHG | WEIGHT: 294.34 LBS | HEART RATE: 74 BPM | RESPIRATION RATE: 18 BRPM | DIASTOLIC BLOOD PRESSURE: 78 MMHG

## 2023-06-08 DIAGNOSIS — E66.01 CLASS 2 SEVERE OBESITY DUE TO EXCESS CALORIES WITH SERIOUS COMORBIDITY AND BODY MASS INDEX (BMI) OF 39.0 TO 39.9 IN ADULT (MULTI): ICD-10-CM

## 2023-06-08 DIAGNOSIS — Z12.5 ENCOUNTER FOR SCREENING FOR MALIGNANT NEOPLASM OF PROSTATE: ICD-10-CM

## 2023-06-08 DIAGNOSIS — I10 ESSENTIAL HYPERTENSION: ICD-10-CM

## 2023-06-08 DIAGNOSIS — Z00.00 HEALTHCARE MAINTENANCE: ICD-10-CM

## 2023-06-08 DIAGNOSIS — Z00.00 HEALTHCARE MAINTENANCE: Primary | ICD-10-CM

## 2023-06-08 DIAGNOSIS — I73.9 PAD (PERIPHERAL ARTERY DISEASE) (CMS-HCC): ICD-10-CM

## 2023-06-08 DIAGNOSIS — I20.89 EFFORT ANGINA (CMS-HCC): ICD-10-CM

## 2023-06-08 DIAGNOSIS — J44.9 CHRONIC OBSTRUCTIVE PULMONARY DISEASE, UNSPECIFIED COPD TYPE (MULTI): ICD-10-CM

## 2023-06-08 DIAGNOSIS — I73.9 CLAUDICATION (CMS-HCC): ICD-10-CM

## 2023-06-08 DIAGNOSIS — E78.2 MIXED HYPERLIPIDEMIA: ICD-10-CM

## 2023-06-08 PROBLEM — M11.269 CHONDROCALCINOSIS OF KNEE: Status: ACTIVE | Noted: 2017-10-16

## 2023-06-08 PROBLEM — E66.812 CLASS 2 SEVERE OBESITY DUE TO EXCESS CALORIES WITH SERIOUS COMORBIDITY AND BODY MASS INDEX (BMI) OF 39.0 TO 39.9 IN ADULT: Status: ACTIVE | Noted: 2023-06-08

## 2023-06-08 LAB
BASOPHILS (10*3/UL) IN BLOOD BY AUTOMATED COUNT: 0.07 X10E9/L (ref 0–0.1)
BASOPHILS/100 LEUKOCYTES IN BLOOD BY AUTOMATED COUNT: 0.7 % (ref 0–2)
EOSINOPHILS (10*3/UL) IN BLOOD BY AUTOMATED COUNT: 0.43 X10E9/L (ref 0–0.7)
EOSINOPHILS/100 LEUKOCYTES IN BLOOD BY AUTOMATED COUNT: 4.3 % (ref 0–6)
ERYTHROCYTE DISTRIBUTION WIDTH (RATIO) BY AUTOMATED COUNT: 14.5 % (ref 11.5–14.5)
ERYTHROCYTE MEAN CORPUSCULAR HEMOGLOBIN CONCENTRATION (G/DL) BY AUTOMATED: 30.9 G/DL (ref 32–36)
ERYTHROCYTE MEAN CORPUSCULAR VOLUME (FL) BY AUTOMATED COUNT: 91 FL (ref 80–100)
ERYTHROCYTES (10*6/UL) IN BLOOD BY AUTOMATED COUNT: 5.35 X10E12/L (ref 4.5–5.9)
HEMATOCRIT (%) IN BLOOD BY AUTOMATED COUNT: 48.9 % (ref 41–52)
HEMOGLOBIN (G/DL) IN BLOOD: 15.1 G/DL (ref 13.5–17.5)
IMMATURE GRANULOCYTES/100 LEUKOCYTES IN BLOOD BY AUTOMATED COUNT: 0.3 % (ref 0–0.9)
LEUKOCYTES (10*3/UL) IN BLOOD BY AUTOMATED COUNT: 10 X10E9/L (ref 4.4–11.3)
LYMPHOCYTES (10*3/UL) IN BLOOD BY AUTOMATED COUNT: 1.77 X10E9/L (ref 1.2–4.8)
LYMPHOCYTES/100 LEUKOCYTES IN BLOOD BY AUTOMATED COUNT: 17.8 % (ref 13–44)
MONOCYTES (10*3/UL) IN BLOOD BY AUTOMATED COUNT: 0.62 X10E9/L (ref 0.1–1)
MONOCYTES/100 LEUKOCYTES IN BLOOD BY AUTOMATED COUNT: 6.2 % (ref 2–10)
NEUTROPHILS (10*3/UL) IN BLOOD BY AUTOMATED COUNT: 7.05 X10E9/L (ref 1.2–7.7)
NEUTROPHILS/100 LEUKOCYTES IN BLOOD BY AUTOMATED COUNT: 70.7 % (ref 40–80)
PLATELETS (10*3/UL) IN BLOOD AUTOMATED COUNT: 259 X10E9/L (ref 150–450)

## 2023-06-08 PROCEDURE — 1036F TOBACCO NON-USER: CPT | Performed by: FAMILY MEDICINE

## 2023-06-08 PROCEDURE — 85025 COMPLETE CBC W/AUTO DIFF WBC: CPT

## 2023-06-08 PROCEDURE — 3078F DIAST BP <80 MM HG: CPT | Performed by: FAMILY MEDICINE

## 2023-06-08 PROCEDURE — 3008F BODY MASS INDEX DOCD: CPT | Performed by: FAMILY MEDICINE

## 2023-06-08 PROCEDURE — 80053 COMPREHEN METABOLIC PANEL: CPT

## 2023-06-08 PROCEDURE — 80061 LIPID PANEL: CPT

## 2023-06-08 PROCEDURE — 99396 PREV VISIT EST AGE 40-64: CPT | Performed by: FAMILY MEDICINE

## 2023-06-08 PROCEDURE — 36415 COLL VENOUS BLD VENIPUNCTURE: CPT

## 2023-06-08 PROCEDURE — 3074F SYST BP LT 130 MM HG: CPT | Performed by: FAMILY MEDICINE

## 2023-06-08 PROCEDURE — 84153 ASSAY OF PSA TOTAL: CPT

## 2023-06-08 RX ORDER — PANTOPRAZOLE SODIUM 40 MG/1
40 TABLET, DELAYED RELEASE ORAL
COMMUNITY
End: 2024-03-15 | Stop reason: SDUPTHER

## 2023-06-08 RX ORDER — SEMAGLUTIDE 1.34 MG/ML
INJECTION, SOLUTION SUBCUTANEOUS
Qty: 1 EACH | Refills: 2 | Status: SHIPPED | OUTPATIENT
Start: 2023-06-08 | End: 2023-06-14

## 2023-06-08 NOTE — ASSESSMENT & PLAN NOTE
General weight loss/lifestyle modification strategies discussed (elicit support from others; identify saboteurs; non-food rewards, etc).  Behavioral treatment  Diet interventions: diet diary indefinitely, low calorie (1500 kCal/d) deficit diet, and qualitative changes (increase low-fat, high-fiber foods).  Regular aerobic exercise program discussed.  Pharmacotherapy as ordered.

## 2023-06-08 NOTE — PROGRESS NOTES
Chief Complaint   Patient presents with    Annual Exam    Follow-up     Hypertension and Hyperlipidemia     He presents for annual physical exam and follow-up on hypertension and hyperlipidemia. He is not exercising and is adherent to a low-salt diet. Patient denies chest pain, dyspnea, exertional chest pressure/discomfort, near-syncope, orthopnea, palpitations, paroxysmal nocturnal dyspnea, and syncope. Taking his medication regularly with no side effects.    Patient is also here for discussion regarding weight loss. He has noted a weight gain of approximately 15 pounds over the last 2 years. History of eating disorders: none. There is a family history positive for obesity in the patient. Previous treatments for obesity include self-directed dieting. Obesity associated medical conditions: coronary artery disease, pulmonary disease, hyperlipidemia, and hypertension. Obesity associated medications: none. Cardiovascular risk factors besides obesity: advanced age (older than 55 for men, 65 for women), dyslipidemia, hypertension, male gender, and obesity (BMI >= 30 kg/m2).    Patient Active Problem List   Diagnosis    Cough in adult    Anemia    Antral ulcer, acute    Asthma    Atherosclerotic heart disease of native coronary artery without angina pectoris    BPPV (benign paroxysmal positional vertigo)    CAD S/P percutaneous coronary angioplasty    Chest pain    Chronic pain of left knee    Cigarette nicotine dependence in remission    Claudication (CMS/HCC)    COPD (chronic obstructive pulmonary disease) (CMS/HCC)    Dizziness    Vertigo    Edema    Effort angina (CMS/HCC)    Elevated WBC count    Essential hypertension    Hypokalemia    Mass of scrotum    Meniere disease    Mixed hyperlipidemia    Obstructive sleep apnea of adult    PAD (peripheral artery disease) (CMS/HCC)    Primary localized osteoarthritis of knees, bilateral    Urinary frequency    Vitamin D deficiency    Class 2 severe obesity due to excess  calories with serious comorbidity and body mass index (BMI) of 39.0 to 39.9 in adult (CMS/[facility])    Chondrocalcinosis of knee       has a current medication list which includes the following prescription(s): aspirin-calcium carbonate, atorvastatin, carvedilol, fluticasone, furosemide, mometasone-formoterol, nitroglycerin, potassium chloride cr, spiriva respimat, dulera, glucosamine-chondroitin, pantoprazole, and ozempic.    Past Medical History:   Diagnosis Date    Acute UTI 05/31/2023    Atherosclerotic heart disease of native coronary artery without angina pectoris 11/23/2020    CAD S/P percutaneous coronary angioplasty    COVID-19 virus infection 05/31/2023    Encounter for screening for malignant neoplasm of prostate     Screening PSA (prostate specific antigen)    Gross hematuria 11/30/2014    Gross hematuria    Hyperlipidemia, unspecified 11/15/2013    Dyslipidemia    Non-ST elevation (NSTEMI) myocardial infarction (CMS/[facility]) 09/24/2018    Non-ST elevation myocardial infarction (NSTEMI) with complication    Personal history of diseases of the blood and blood-forming organs and certain disorders involving the immune mechanism 12/14/2016    History of leukocytosis    Personal history of other diseases of the respiratory system 04/08/2014    History of acute sinusitis    Personal history of other diseases of the respiratory system 04/08/2014    History of status asthmaticus    Personal history of other specified conditions 04/08/2019    History of fatigue    Personal history of peptic ulcer disease 12/05/2016    History of bleeding peptic ulcer    Personal history of urinary (tract) infections 11/30/2014    History of acute cystitis    Recurrent UTI 05/31/2023    Sebaceous cyst 01/16/2015    Infected sebaceous cyst of skin    Unspecified asthma with (acute) exacerbation 01/16/2015    Asthma exacerbation    Vitamin D deficiency, unspecified 12/12/2016    Vitamin D deficiency       Past Surgical History:   Procedure  Laterality Date    CORONARY ANGIOPLASTY WITH STENT PLACEMENT  10/02/2013    Cath Stent Placement    OTHER SURGICAL HISTORY  02/20/2017    Colonoscopy (Fiberoptic) Ascending Colon    OTHER SURGICAL HISTORY  06/07/2014    Previous Stent Placement    UPPER GASTROINTESTINAL ENDOSCOPY  02/20/2017    Endoscopic Ultrasound Upper Gastrointestinal Esophageal       family history includes CARDIAC DISORDER in his father.    Social History     Socioeconomic History    Marital status:      Spouse name: Not on file    Number of children: Not on file    Years of education: Not on file    Highest education level: Not on file   Occupational History    Not on file   Tobacco Use    Smoking status: Never    Smokeless tobacco: Never   Vaping Use    Vaping status: Never Used   Substance and Sexual Activity    Alcohol use: Never    Drug use: Never    Sexual activity: Defer   Other Topics Concern    Not on file   Social History Narrative    Not on file     Social Determinants of Health     Financial Resource Strain: Not on file   Food Insecurity: Not on file   Transportation Needs: Not on file   Physical Activity: Not on file   Stress: Not on file   Social Connections: Not on file   Intimate Partner Violence: Not on file   Housing Stability: Not on file       No Known Allergies    Review of Systems -   General ROS: negative for - fatigue, fever, malaise, night sweats or weight loss  Eyes ROS no blurred vision, no double vision, no eye discharge and no eye redness.  ENT ROS: negative for - hearing change, nasal discharge, oral lesions, sinus pain, sore throat, tinnitus or vertigo  Respiratory ROS: negative for - cough, hemoptysis, pleuritic pain, shortness of breath or wheezing  Cardiovascular ROS: no chest pain or dyspnea on exertion, palpitations, PND or orthopnea.  No syncope.  Gastrointestinal ROS: no abdominal pain, change in bowel habits, or black or bloody stools  Genito-Urinary ROS: no dysuria, trouble voiding, or  hematuria  Dermatological ROS: negative for - dry skin, lumps, pruritus or rash  Musculoskeletal ROS: negative for - joint pain, joint stiffness, joint swelling, muscle pain or muscular weakness  Neurological ROS: negative for - dizziness, gait disturbance, headaches, impaired coordination/balance, numbness/tingling, tremors or visual changes  Psychological ROS: negative for - anxiety, concentration difficulties, depression, memory difficulties or sleep disturbances  Endocrine ROS: negative for - hot flashes, malaise/lethargy, palpitations, polydipsia/polyuria, skin changes, temperature intolerance   Hematological and Lymphatic ROS: negative for - bruising, night sweats or pallor    Blood pressure 112/78, pulse 74, temperature 36.6 °C (97.8 °F), resp. rate 18, height 1.829 m (6'), weight 134 kg (294 lb 5.4 oz), SpO2 98 %.    Physical Examination:   General appearance - alert, well appearing, and in no distress  HEENT EOMI, PEERLA, normal eyelids.  Oropharynx no erythema or exudate.  Normal tonsils.    Ears -external auditory canal normal bilaterally.  Tympanic membranes normal bilaterally.  Mouth - mucous membranes moist, pharynx normal without lesions  Neck - supple, trachea central no thyromegaly.  No significant adenopathy  Chest -good air entry bilaterally, no rhonchi rales and no wheezes.  Heart -Normal S1 and S2, regular rate and rhythm with no murmurs gallop or rub.  Abdomen -Non distended, soft, nontender with no guarding, no palpable mass and no CVA tenderness.  Bowel sounds normal.  No hernia.  Skin no rash, nonicteric and warm to touch.  Neurological - alert, oriented, cranial nerves II through XII normal.  Reflexes 2+ bilaterally.  No focal deficit.  Musculoskeletal - no joint tenderness, deformity or swelling  Extremities: peripheral pulses normal, no clubbing or cyanosis.    Problem List Items Addressed This Visit       Claudication (CMS/[facility])    Current Assessment & Plan     Chronic Condition  "Documentation : Stable based on symptoms and exam.  Continue established treatment plan and follow-up at least yearly.         COPD (chronic obstructive pulmonary disease) (CMS/[facility])    Current Assessment & Plan     Chronic Condition Documentation : Stable based on symptoms and exam.  Continue established treatment plan and follow-up at least yearly.           Effort angina (CMS/[facility])    Current Assessment & Plan     Chronic Condition Documentation : Stable based on symptoms and exam.  Continue established treatment plan and follow-up at least yearly.         Essential hypertension    Current Assessment & Plan     Dietary sodium restriction.  Regular aerobic exercise program is recommended to help achieve and maintain normal body weight, fitness and improve lipid balance. .  Dietary changes: Increase soluble fiber  Plant sterols 2grams per day (e.g. Benecol)  Reduce saturated fat, \"trans\" monounsaturated fatty acids, and cholesterol           Mixed hyperlipidemia    Current Assessment & Plan     The nature of cardiac risk has been fully discussed with this patient. Discussed cardiovascular risk analysis and appropriate diet with the need for lifelong measures to reduce the risk. A regular exercise program is recommended to help achieve and maintain normal body weight, fitness and improve lipid balance. Patient education provided. They understand and agree with this course of treatment. They will return with new or worsening symptoms. Patient instructed to remain current with appropriate annual health maintenance.            Relevant Medications    semaglutide (Ozempic) 0.25 mg or 0.5 mg(2 mg/1.5 mL) pen injector    PAD (peripheral artery disease) (CMS/[facility])    Current Assessment & Plan     Chronic Condition Documentation : Stable based on symptoms and exam.  Continue established treatment plan and follow-up at least yearly.         Class 2 severe obesity due to excess calories with serious comorbidity and body mass " index (BMI) of 39.0 to 39.9 in adult (CMS/[facility])    Current Assessment & Plan     General weight loss/lifestyle modification strategies discussed (elicit support from others; identify saboteurs; non-food rewards, etc).  Behavioral treatment  Diet interventions: diet diary indefinitely, low calorie (1500 kCal/d) deficit diet, and qualitative changes (increase low-fat, high-fiber foods).  Regular aerobic exercise program discussed.  Pharmacotherapy as ordered.             Relevant Medications    semaglutide (Ozempic) 0.25 mg or 0.5 mg(2 mg/1.5 mL) pen injector     Other Visit Diagnoses       Healthcare maintenance    -  Primary    Relevant Orders    CBC and Auto Differential (Completed)    Comprehensive Metabolic Panel    Lipid Panel    Encounter for screening for malignant neoplasm of prostate        Relevant Orders    Prostate Specific Antigen, Screen             Outpatient Encounter Medications as of 6/8/2023   Medication Sig Dispense Refill    aspirin-calcium carbonate 81 mg-300 mg calcium(777 mg) tablet Take 1 tablet by mouth once daily.      atorvastatin (Lipitor) 80 mg tablet Take 1 tablet (80 mg) by mouth once daily.      carvedilol (Coreg) 6.25 mg tablet Take by mouth.      fluticasone (Flonase) 50 mcg/actuation nasal spray Administer 1 spray into each nostril 2 times a day.      furosemide (Lasix) 40 mg tablet Take 1 tablet (40 mg) by mouth once daily.      mometasone-formoterol (Dulera 200) 200-5 mcg/actuation inhaler Inhale.      nitroglycerin (Nitrostat) 0.4 mg SL tablet Place under the tongue.      potassium chloride CR 10 mEq ER tablet TAKE 1 TABLET DAILY WITH FOOD 90 tablet 0    tiotropium (Spiriva Respimat) 1.25 mcg/actuation inhaler USE 2 INHALATIONS DAILY 12 g 0    Dulera 100-5 mcg/actuation inhaler Inhale 2 puffs 2 times a day.      glucosamine-chondroitin 250-200 mg tablet Take by mouth.      pantoprazole (ProtoNix) 40 mg EC tablet Take 1 tablet (40 mg) by mouth once daily in the morning. Take  before meals.      semaglutide (Ozempic) 0.25 mg or 0.5 mg(2 mg/1.5 mL) pen injector Inject 0.25 mg subcutaneous weekly x 4 week then increase to 0.5 mg weekly 1 each 2     No facility-administered encounter medications on file as of 6/8/2023.     The nature of cardiac risk has been fully discussed with this patient. I have made  aware of  LDL target goal given  cardiovascular risk analysis. I have discussed the appropriate diet. The need for lifelong compliance in order to reduce risk is stressed. A regular exercise program is recommended to help achieve and maintain normal body weight, fitness and improve lipid balance. A written copy of a low fat, low cholesterol diet has been given to the patient.    Patient education provided. They understand and agree with this course of treatment. They will return with new or worsening symptoms. Patient instructed to remain current with appropriate annual health maintenance.     Scribe Attestation  By signing my name below, IHarvey Scribe   attest that this documentation has been prepared under the direction and in the presence of Usama Giang MD.

## 2023-06-08 NOTE — LETTER
June 8, 2023     Patient: Simba Washington   YOB: 1960   Date of Visit: 6/8/2023       To Whom It May Concern:    It is my medical opinion that Mr. Washington requires a disability parking placard for the following reasons:  He cannot walk 200 feet without stopping to rest.  Duration of need: 5 years Expires: 6/7/2028    If you have any questions or concerns, please don't hesitate to call.         Sincerely,        Usama Giang MD        CC: No Recipients

## 2023-06-08 NOTE — ASSESSMENT & PLAN NOTE
Chronic Condition Documentation : Stable based on symptoms and exam.  Continue established treatment plan and follow-up at least yearly.

## 2023-06-08 NOTE — PATIENT INSTRUCTIONS
BMI was above normal measurement. Current weight: 134 kg (294 lb 5.4 oz)  Weight change since last visit (-) denotes wt loss 1.34 lbs   Weight loss needed to achieve BMI 25: 110.44 Lbs  Weight loss needed to achieve BMI 30: 73.64 Lbs  Advised to Increase physical activity.

## 2023-06-09 LAB
ALANINE AMINOTRANSFERASE (SGPT) (U/L) IN SER/PLAS: 13 U/L (ref 10–52)
ALBUMIN (G/DL) IN SER/PLAS: 4.2 G/DL (ref 3.4–5)
ALKALINE PHOSPHATASE (U/L) IN SER/PLAS: 92 U/L (ref 33–136)
ANION GAP IN SER/PLAS: 17 MMOL/L (ref 10–20)
ASPARTATE AMINOTRANSFERASE (SGOT) (U/L) IN SER/PLAS: 18 U/L (ref 9–39)
BILIRUBIN TOTAL (MG/DL) IN SER/PLAS: 1 MG/DL (ref 0–1.2)
CALCIUM (MG/DL) IN SER/PLAS: 8.9 MG/DL (ref 8.6–10.3)
CARBON DIOXIDE, TOTAL (MMOL/L) IN SER/PLAS: 26 MMOL/L (ref 21–32)
CHLORIDE (MMOL/L) IN SER/PLAS: 102 MMOL/L (ref 98–107)
CHOLESTEROL (MG/DL) IN SER/PLAS: 139 MG/DL (ref 0–199)
CHOLESTEROL IN HDL (MG/DL) IN SER/PLAS: 36.7 MG/DL
CHOLESTEROL/HDL RATIO: 3.8
CREATININE (MG/DL) IN SER/PLAS: 0.92 MG/DL (ref 0.5–1.3)
GFR MALE: >90 ML/MIN/1.73M2
GLUCOSE (MG/DL) IN SER/PLAS: 108 MG/DL (ref 74–99)
LDL: 79 MG/DL (ref 0–99)
POTASSIUM (MMOL/L) IN SER/PLAS: 4.5 MMOL/L (ref 3.5–5.3)
PROSTATE SPECIFIC ANTIGEN,SCREEN: 0.72 NG/ML (ref 0–4)
PROTEIN TOTAL: 7.3 G/DL (ref 6.4–8.2)
SODIUM (MMOL/L) IN SER/PLAS: 140 MMOL/L (ref 136–145)
TRIGLYCERIDE (MG/DL) IN SER/PLAS: 116 MG/DL (ref 0–149)
UREA NITROGEN (MG/DL) IN SER/PLAS: 18 MG/DL (ref 6–23)
VLDL: 23 MG/DL (ref 0–40)

## 2023-06-09 NOTE — TELEPHONE ENCOUNTER
We received a request for prior authorization on the patient's Ozempic  first 42 day supply from their pharmacy. Prior authorization was submitted to insurance today. We will await their determination.

## 2023-06-13 NOTE — TELEPHONE ENCOUNTER
PA DENIED- Coverage is provided for the treatment of type 2 diabetes mellitus. Coverage cannot be  authorized at this time. \    PT MADE AWARE VIA Take5

## 2023-06-15 ENCOUNTER — TELEPHONE (OUTPATIENT)
Dept: PRIMARY CARE | Facility: CLINIC | Age: 63
End: 2023-06-15
Payer: COMMERCIAL

## 2023-06-15 NOTE — TELEPHONE ENCOUNTER
Simba called in to let AP know that the WeGovy AP sent to [facility] in [place] isn't covered through his insurance either. He is asking if there is another medication he can try or what does he recommend ?  Please advise.

## 2023-07-03 ENCOUNTER — APPOINTMENT (OUTPATIENT)
Dept: PRIMARY CARE | Facility: CLINIC | Age: 63
End: 2023-07-03
Payer: COMMERCIAL

## 2023-07-31 DIAGNOSIS — R60.9 EDEMA, UNSPECIFIED TYPE: Primary | ICD-10-CM

## 2023-07-31 RX ORDER — FUROSEMIDE 40 MG/1
40 TABLET ORAL DAILY
Qty: 90 TABLET | Refills: 2 | Status: SHIPPED | OUTPATIENT
Start: 2023-07-31 | End: 2024-03-18

## 2023-09-03 DIAGNOSIS — J30.9 CHRONIC ALLERGIC RHINITIS: Primary | ICD-10-CM

## 2023-09-03 RX ORDER — FLUTICASONE PROPIONATE 50 MCG
2 SPRAY, SUSPENSION (ML) NASAL DAILY
Qty: 48 G | Refills: 3 | Status: SHIPPED | OUTPATIENT
Start: 2023-09-03

## 2023-10-16 DIAGNOSIS — E87.6 HYPOKALEMIA: ICD-10-CM

## 2023-10-16 RX ORDER — POTASSIUM CHLORIDE 750 MG/1
10 TABLET, FILM COATED, EXTENDED RELEASE ORAL DAILY
Qty: 90 TABLET | Refills: 1 | Status: SHIPPED | OUTPATIENT
Start: 2023-10-16 | End: 2024-03-15 | Stop reason: SDUPTHER

## 2023-10-16 NOTE — TELEPHONE ENCOUNTER
Rx Refill Request Telephone Encounter    Name:  Simba Washington  :  [id number]  Medication Name:  potassium chloride 10mg    [facility]  Date of last appointment:  7/3/23  Date of next appointment:  na  Best number to reach patient:  [phone]

## 2023-10-27 ENCOUNTER — TELEPHONE (OUTPATIENT)
Dept: PULMONOLOGY | Facility: CLINIC | Age: 63
End: 2023-10-27
Payer: COMMERCIAL

## 2023-10-27 NOTE — TELEPHONE ENCOUNTER
Pt is scheduled with  on 12/4 at 1140.    Due to template changes appointment time has been moved to 1130.    Left detailed msg on  regarding the time change.    Office number given he has questions or concerns.  
yes

## 2023-12-04 ENCOUNTER — OFFICE VISIT (OUTPATIENT)
Dept: PULMONOLOGY | Facility: CLINIC | Age: 63
End: 2023-12-04
Payer: COMMERCIAL

## 2023-12-04 VITALS
HEART RATE: 84 BPM | TEMPERATURE: 98.2 F | OXYGEN SATURATION: 96 % | DIASTOLIC BLOOD PRESSURE: 81 MMHG | BODY MASS INDEX: 40.55 KG/M2 | HEIGHT: 72 IN | SYSTOLIC BLOOD PRESSURE: 131 MMHG | WEIGHT: 299.4 LBS | RESPIRATION RATE: 18 BRPM

## 2023-12-04 DIAGNOSIS — J41.8 MIXED SIMPLE AND MUCOPURULENT CHRONIC BRONCHITIS (MULTI): Primary | ICD-10-CM

## 2023-12-04 DIAGNOSIS — G47.33 OSA TREATED WITH BIPAP: ICD-10-CM

## 2023-12-04 PROCEDURE — 3075F SYST BP GE 130 - 139MM HG: CPT | Performed by: INTERNAL MEDICINE

## 2023-12-04 PROCEDURE — 3008F BODY MASS INDEX DOCD: CPT | Performed by: INTERNAL MEDICINE

## 2023-12-04 PROCEDURE — 99214 OFFICE O/P EST MOD 30 MIN: CPT | Performed by: INTERNAL MEDICINE

## 2023-12-04 PROCEDURE — 1036F TOBACCO NON-USER: CPT | Performed by: INTERNAL MEDICINE

## 2023-12-04 PROCEDURE — 3079F DIAST BP 80-89 MM HG: CPT | Performed by: INTERNAL MEDICINE

## 2023-12-04 ASSESSMENT — SLEEP AND FATIGUE QUESTIONNAIRES
HOW LIKELY ARE YOU TO NOD OFF OR FALL ASLEEP WHILE SITTING AND READING: WOULD NEVER DOZE
HOW LIKELY ARE YOU TO NOD OFF OR FALL ASLEEP WHILE SITTING INACTIVE IN A PUBLIC PLACE: WOULD NEVER DOZE
HOW LIKELY ARE YOU TO NOD OFF OR FALL ASLEEP IN A CAR, WHILE STOPPED FOR A FEW MINUTES IN TRAFFIC: WOULD NEVER DOZE
HOW LIKELY ARE YOU TO NOD OFF OR FALL ASLEEP WHILE SITTING QUIETLY AFTER LUNCH WITHOUT ALCOHOL: WOULD NEVER DOZE
HOW LIKELY ARE YOU TO NOD OFF OR FALL ASLEEP WHILE SITTING AND TALKING TO SOMEONE: WOULD NEVER DOZE
ESS TOTAL SCORE: 0
HOW LIKELY ARE YOU TO NOD OFF OR FALL ASLEEP WHEN YOU ARE A PASSENGER IN A CAR FOR AN HOUR WITHOUT A BREAK: WOULD NEVER DOZE
HOW LIKELY ARE YOU TO NOD OFF OR FALL ASLEEP WHILE LYING DOWN TO REST IN THE AFTERNOON WHEN CIRCUMSTANCES PERMIT: WOULD NEVER DOZE
HOW LIKELY ARE YOU TO NOD OFF OR FALL ASLEEP WHILE WATCHING TV: WOULD NEVER DOZE

## 2023-12-05 NOTE — PROGRESS NOTES
Department of Medicine  Division of Pulmonary, Critical Care, and Sleep Medicine  Follow-Up Visit  [facility] - [address], [address]    Physician HPI:  Mr Washington is a 63-year-old male with past medical history significant for heavy tobacco use (quit 3 years ago), obesity, diastolic heart failure, COPD GOLD III and RYAN who presented to the office today for follow up.     Simba was last seen in December 2018. He reports overall stable respiratory status over the past year. No interval hx of COPD exacerbation or hospitalizations. He was tested positive for COVID19 in April. His symptoms were mild and he was isolated at home until recovery. He is using Dulera and Spiriva for COPD. He is compliant with BiPAP every night and reports to feel better with PAP therapy. Denies excessive daytime fatigue or sleepiness. He has chronic dyspnea on exertion. He is able to conduct his daily activities. No purulent sputum production now, orthopnea or LE edema.      Follow-up 11/22/2022:  No acute respiratory symptoms today no or interval history of acute COPD exacerbation or hospital admission. Using ICS/LABA as instruction. On noninvasive ventilation at nighttime.     Follow up 6/15/2023:  Respiratory status has been stable since last visit. No interval hx of COPD exacerbation. Compliant with NIV at night time.     Follow up 12/04/2023:  Simba reports stable respiratory status since last visit. No interval hx of acute COPD exacerbation.     Immunization History   Administered Date(s) Administered    Flu vaccine (IIV4), preservative free *Check age/dose* 10/13/2014, 11/02/2020    Influenza, Unspecified 10/04/2003    Influenza, injectable, quadrivalent 10/05/2018    Influenza, seasonal, injectable 09/28/2012    Influenza, seasonal, injectable, preservative free 09/23/2011    Pneumococcal polysaccharide vaccine, 23-valent, age 2 years and older (PNEUMOVAX 23) 10/05/2018       Current Outpatient Medications   Medication Instructions     aspirin-calcium carbonate 81 mg-300 mg calcium(777 mg) tablet 1 tablet, oral, Daily    atorvastatin (Lipitor) 80 mg tablet 1 tablet, oral, Daily    carvedilol (Coreg) 6.25 mg tablet oral    Dulera 100-5 mcg/actuation inhaler 2 puffs, inhalation, 2 times daily    fluticasone (Flonase) 50 mcg/actuation nasal spray 2 sprays, Each Nostril, Daily    furosemide (LASIX) 40 mg, oral, Daily    glucosamine-chondroitin 250-200 mg tablet oral    mometasone-formoterol (Dulera 200) 200-5 mcg/actuation inhaler inhalation    nitroglycerin (Nitrostat) 0.4 mg SL tablet sublingual    pantoprazole (PROTONIX) 40 mg, oral, Daily before breakfast    potassium chloride CR 10 mEq ER tablet 10 mEq, oral, Daily, Take with food.    tiotropium (Spiriva Respimat) 1.25 mcg/actuation inhaler USE 2 INHALATIONS DAILY        Allergies:  No Known Allergies       Visit Vitals  /81 (BP Location: Left arm, Patient Position: Sitting, BP Cuff Size: Large adult)   Pulse 84   Temp 36.8 °C (98.2 °F) (Temporal)   Resp 18   Ht 1.829 m (6')   Wt 136 kg (299 lb 6.4 oz)   SpO2 96%   BMI 40.61 kg/m²   Smoking Status Never   BSA 2.63 m²        Physical Exam     Constitutional: General appearance: Abnormal.  appears healthy,~overweight~and~well hydrated.   Ears, Nose, Mouth, and Throat: External inspection of ears and nose: Normal.    Pulmonary: Chest: Normal to inspection. ~Respiratory effort: No increased work of breathing or signs of respiratory distress. ~Auscultation of lungs: Clear to auscultation bilaterally.    Cardiovascular: Heart rate and rhythm were normal, normal S1 and S2, no gallops, no murmurs and no pericardial rub.~No peripheral edema.   Lymphatic: No lymphadenopathy.   Skin: Normal skin color and pigmentation, normal skin turgor, and no rash.   Psychiatric: Judgment and insight: Intact.~Alert and oriented to person, place and time.~Mood and affect: Normal.        Chest Radiograph     XR chest 1 view 07/19/2022    Narrative  MRN:  [id number]  Patient Name: MARTHA MENDIOLA    STUDY:  CHEST 1 VIEW;  7/19/2022 5:20 am    INDICATION:  Chest Pain .    COMPARISON:  Chest radiograph 08/28/2019    ACCESSION NUMBER(S):  [id number]    ORDERING CLINICIAN:  TERESA GEORGE    FINDINGS:      CARDIOMEDIASTINAL SILHOUETTE:  Cardiomediastinal silhouette is stable in size and configuration.    LUNGS:  Curvilinear opacity overlying the left lung base. No pleural effusion  or pneumothorax.    ABDOMEN:  No remarkable upper abdominal findings.    BONES:  No acute osseous abnormality.    Impression  Subsegmental atelectasis versus scarring in the left lung base.      XR CHEST 1 VIEW 08/28/2019    Narrative  MRN: [id number]  Patient Name: MARTHA MENDIOLA    STUDY:  CHEST 1 VIEW; 8/28/2019 10:06 am    INDICATION:  Chest Pain.    COMPARISON:  07/30/2018    ACCESSION NUMBER(S):  [id number]    ORDERING CLINICIAN:  DANDRE LEE    TECHNIQUE:  Portable upright frontal view of the chest    FINDINGS:  Cardiomediastinal silhouette is stable. There is mild right lower  lobe peribronchial cuffing which could be caused by an infectious or  inflammatory process. No consolidation, pleural effusion or  pneumothorax.    Impression  As above    Laboratory Studies     Lab Results   Component Value Date    WBC 10.0 06/08/2023    HGB 15.1 06/08/2023    HCT 48.9 06/08/2023    MCV 91 06/08/2023     06/08/2023      Lab Results   Component Value Date    GLUCOSE 108 (H) 06/08/2023    CALCIUM 8.9 06/08/2023     06/08/2023    K 4.5 06/08/2023    CO2 26 06/08/2023     06/08/2023    BUN 18 06/08/2023    CREATININE 0.92 06/08/2023      Lab Results   Component Value Date    ALT 13 06/08/2023    AST 18 06/08/2023    ALKPHOS 92 06/08/2023    BILITOT 1.0 06/08/2023        Protime   Date/Time Value Ref Range Status   07/19/2022 05:17 AM 12.0 9.8 - 13.4 sec Final     INR   Date/Time Value Ref Range Status   07/19/2022 05:17 AM 1.0 0.9 - 1.1 Final       Assessment and Plan / Recommendations     1.  COPD GOLD III, MMRC II  2. Chronic hypoxic and hypercapnic respiratory failure  3. Obstructive sleep apnea   4. HFpEF  5. Overweight   6. Lung cancer screening         Plan:  1. Continue on current inhaler regimen  2. Continue non-invasive ventilation at night time. He is interested in repeating sleep study to re-evaluate severity of sleep apnea.   3. Up to date with influenza and Pneumococcal vaccinations  4. Weight loss is recommended  5. LDCT for lung cancer screening is recommended but declined by the patient.       Follow-up in 6 months or sooner if necessary.        Please excuse any misspellings or unintended errors related to the Dragon speech recognition software used to dictate this note.         Shawna Villar MD   12/04/2023

## 2024-02-08 DIAGNOSIS — I25.10 CAD S/P PERCUTANEOUS CORONARY ANGIOPLASTY: ICD-10-CM

## 2024-02-08 DIAGNOSIS — Z98.61 CAD S/P PERCUTANEOUS CORONARY ANGIOPLASTY: ICD-10-CM

## 2024-02-08 RX ORDER — CARVEDILOL 6.25 MG/1
6.25 TABLET ORAL
Qty: 180 TABLET | Refills: 3 | Status: SHIPPED | OUTPATIENT
Start: 2024-02-08

## 2024-02-08 NOTE — TELEPHONE ENCOUNTER
Patient last seen on 05/12/23. Medication last renewed on 02/09/23 w/ a year supply. Upcoming apt. On 05/2024.

## 2024-03-11 DIAGNOSIS — J45.909 ASTHMA, UNSPECIFIED ASTHMA SEVERITY, UNSPECIFIED WHETHER COMPLICATED, UNSPECIFIED WHETHER PERSISTENT (HHS-HCC): ICD-10-CM

## 2024-03-11 RX ORDER — TIOTROPIUM BROMIDE INHALATION SPRAY 1.56 UG/1
SPRAY, METERED RESPIRATORY (INHALATION)
Qty: 4 G | Refills: 0 | OUTPATIENT
Start: 2024-03-11

## 2024-03-12 DIAGNOSIS — J45.909 ASTHMA, UNSPECIFIED ASTHMA SEVERITY, UNSPECIFIED WHETHER COMPLICATED, UNSPECIFIED WHETHER PERSISTENT (HHS-HCC): ICD-10-CM

## 2024-03-13 RX ORDER — TIOTROPIUM BROMIDE INHALATION SPRAY 1.56 UG/1
2 SPRAY, METERED RESPIRATORY (INHALATION) DAILY
Qty: 36 G | Refills: 3 | Status: SHIPPED | OUTPATIENT
Start: 2024-03-13 | End: 2025-03-13

## 2024-03-15 DIAGNOSIS — K21.9 GASTRO-ESOPHAGEAL REFLUX DISEASE WITHOUT ESOPHAGITIS: Primary | ICD-10-CM

## 2024-03-15 DIAGNOSIS — E87.6 HYPOKALEMIA: ICD-10-CM

## 2024-03-15 DIAGNOSIS — J41.8 MIXED SIMPLE AND MUCOPURULENT CHRONIC BRONCHITIS (MULTI): ICD-10-CM

## 2024-03-15 RX ORDER — POTASSIUM CHLORIDE 750 MG/1
10 TABLET, FILM COATED, EXTENDED RELEASE ORAL DAILY
Qty: 90 TABLET | Refills: 1 | Status: SHIPPED | OUTPATIENT
Start: 2024-03-15 | End: 2024-05-31 | Stop reason: SDUPTHER

## 2024-03-15 RX ORDER — PANTOPRAZOLE SODIUM 40 MG/1
40 TABLET, DELAYED RELEASE ORAL
Qty: 90 TABLET | Refills: 1 | Status: SHIPPED | OUTPATIENT
Start: 2024-03-15 | End: 2024-05-14 | Stop reason: WASHOUT

## 2024-03-15 NOTE — TELEPHONE ENCOUNTER
Rx Refill Request     Name: Simba Washington  :  1960   Medication Name:    potassium chloride CR 10 mEq ER tablet - Take 1 tablet (10 mEq) by mouth once daily.Take with food  pantoprazole (ProtoNix) 40 mg EC tablet - Take 1 tablet (40 mg) by mouth once daily in the morning. Take before meals.  furosemide (Lasix) 40 mg tablet - Take 1 tablet daily.  Specific Pharmacy location:  [facility]  Date of last appointment:  2023   Date of next appointment:  Visit date not found   Best number to reach patient:  [phone]

## 2024-03-15 NOTE — TELEPHONE ENCOUNTER
I pended medications for review/ approval -- it does not look like you have sent him the pantoprazole before, I could only find a Rx from Jan 2021 order by a Dr Woodson.  -- I do not have a DX for the med.      Pls advise

## 2024-03-16 RX ORDER — MOMETASONE FUROATE AND FORMOTEROL FUMARATE DIHYDRATE 100; 5 UG/1; UG/1
2 AEROSOL RESPIRATORY (INHALATION) 2 TIMES DAILY
Qty: 39 G | Refills: 3 | Status: SHIPPED | OUTPATIENT
Start: 2024-03-16 | End: 2024-05-23 | Stop reason: SDUPTHER

## 2024-03-18 DIAGNOSIS — R60.9 EDEMA, UNSPECIFIED TYPE: ICD-10-CM

## 2024-03-18 DIAGNOSIS — I25.10 ATHEROSCLEROSIS OF NATIVE CORONARY ARTERY OF NATIVE HEART WITHOUT ANGINA PECTORIS: ICD-10-CM

## 2024-03-18 RX ORDER — FUROSEMIDE 40 MG/1
40 TABLET ORAL DAILY
Qty: 90 TABLET | Refills: 0 | Status: SHIPPED | OUTPATIENT
Start: 2024-03-18 | End: 2024-04-24

## 2024-03-18 RX ORDER — ATORVASTATIN CALCIUM 80 MG/1
80 TABLET, FILM COATED ORAL DAILY
Qty: 90 TABLET | Refills: 0 | Status: SHIPPED | OUTPATIENT
Start: 2024-03-18 | End: 2024-06-16

## 2024-03-18 NOTE — TELEPHONE ENCOUNTER
Patient was last seen on 5/12/23 has an upcomming appt on 5/14/24 pharmacy called in patient is out of medication.

## 2024-03-18 NOTE — TELEPHONE ENCOUNTER
Rx Refill Request Telephone Encounter    Name:  Simba Washington  :  [id number]  Medication Name:  lasix 40mg   Specific Pharmacy location:  [facility] mail order   Date of last appointment:  23  Date of next appointment:  N/A   Best number to reach patient:  [phone]

## 2024-04-22 ENCOUNTER — TELEPHONE (OUTPATIENT)
Dept: PRIMARY CARE | Facility: CLINIC | Age: 64
End: 2024-04-22

## 2024-04-22 NOTE — TELEPHONE ENCOUNTER
Simba would like to schedule an appt with Dr. Rahat goins he's been feeling very fatigued all day long no matter the amount of sleep he gets, he spends most of the day feeling run down and overall not himself. He was wondering if maybe dr. Giang would like to run some blood work to see what's going on but hed also like to see him in person and doesn't want to wait till July. Please advise

## 2024-04-24 DIAGNOSIS — R60.9 EDEMA, UNSPECIFIED TYPE: ICD-10-CM

## 2024-04-24 RX ORDER — FUROSEMIDE 40 MG/1
40 TABLET ORAL DAILY
Qty: 90 TABLET | Refills: 0 | Status: SHIPPED | OUTPATIENT
Start: 2024-04-24 | End: 2024-05-22 | Stop reason: SDUPTHER

## 2024-04-29 ENCOUNTER — APPOINTMENT (OUTPATIENT)
Dept: PRIMARY CARE | Facility: CLINIC | Age: 64
End: 2024-04-29

## 2024-05-03 DIAGNOSIS — R60.9 EDEMA, UNSPECIFIED TYPE: ICD-10-CM

## 2024-05-04 RX ORDER — FUROSEMIDE 40 MG/1
40 TABLET ORAL DAILY
Qty: 90 TABLET | Refills: 3 | OUTPATIENT
Start: 2024-05-04

## 2024-05-14 ENCOUNTER — OFFICE VISIT (OUTPATIENT)
Dept: CARDIOLOGY | Facility: CLINIC | Age: 64
End: 2024-05-14
Payer: MEDICARE

## 2024-05-14 VITALS
OXYGEN SATURATION: 96 % | DIASTOLIC BLOOD PRESSURE: 78 MMHG | BODY MASS INDEX: 40.12 KG/M2 | WEIGHT: 296.2 LBS | HEART RATE: 91 BPM | HEIGHT: 72 IN | SYSTOLIC BLOOD PRESSURE: 124 MMHG

## 2024-05-14 DIAGNOSIS — I25.10 CAD S/P PERCUTANEOUS CORONARY ANGIOPLASTY: Primary | ICD-10-CM

## 2024-05-14 DIAGNOSIS — E78.2 MIXED HYPERLIPIDEMIA: ICD-10-CM

## 2024-05-14 DIAGNOSIS — I10 ESSENTIAL HYPERTENSION: ICD-10-CM

## 2024-05-14 DIAGNOSIS — Z98.61 CAD S/P PERCUTANEOUS CORONARY ANGIOPLASTY: Primary | ICD-10-CM

## 2024-05-14 PROBLEM — E66.9 OBESITY (BMI 35.0-39.9 WITHOUT COMORBIDITY): Status: ACTIVE | Noted: 2023-09-15

## 2024-05-14 PROCEDURE — 3078F DIAST BP <80 MM HG: CPT | Performed by: NURSE PRACTITIONER

## 2024-05-14 PROCEDURE — 1160F RVW MEDS BY RX/DR IN RCRD: CPT | Performed by: NURSE PRACTITIONER

## 2024-05-14 PROCEDURE — 1126F AMNT PAIN NOTED NONE PRSNT: CPT | Performed by: NURSE PRACTITIONER

## 2024-05-14 PROCEDURE — 1036F TOBACCO NON-USER: CPT | Performed by: NURSE PRACTITIONER

## 2024-05-14 PROCEDURE — 1157F ADVNC CARE PLAN IN RCRD: CPT | Performed by: NURSE PRACTITIONER

## 2024-05-14 PROCEDURE — 3074F SYST BP LT 130 MM HG: CPT | Performed by: NURSE PRACTITIONER

## 2024-05-14 PROCEDURE — 1159F MED LIST DOCD IN RCRD: CPT | Performed by: NURSE PRACTITIONER

## 2024-05-14 PROCEDURE — 99214 OFFICE O/P EST MOD 30 MIN: CPT | Performed by: NURSE PRACTITIONER

## 2024-05-14 PROCEDURE — 3008F BODY MASS INDEX DOCD: CPT | Performed by: NURSE PRACTITIONER

## 2024-05-14 PROCEDURE — 93000 ELECTROCARDIOGRAM COMPLETE: CPT | Performed by: NURSE PRACTITIONER

## 2024-05-14 ASSESSMENT — PAIN SCALES - GENERAL: PAINLEVEL: 0-NO PAIN

## 2024-05-14 NOTE — PROGRESS NOTES
Name : Simba Washington   : 1960   MRN : [id number]   ENC Date : 2024    CC: Annual Exam     HPI:    Simba Washington is a 64 y.o. male PMHx sig for CAD s/p PCI LAD TARA (2013), HTN, HLD, COPD & RYAN on BiPap who presents with his Wife today for annual cardiovascular follow up.     Retired from [employer] in 2024. Got a puppy the week he retired which is keeping him active. Walking puppy further each time. Wants to lose weight. Denies any chest pain, pressure, SOB/RODGERS, PND, orthopnea, LE edema, palpitations, lightheadedness, dizziness, or syncope.     CV Diagnostics:  Stress echo 8/3/22: Normal. EF 60% at baseline & 75% at peak.    Echo 18: EF 55-60%, LVH, mild TR    ROS: unless otherwise noted in the history of present illness, all other systems were reviewed and they are negative for complaints     Allergies:  Patient has no known allergies.    Current Outpatient Medications   Medication Instructions    aspirin-calcium carbonate 81 mg-300 mg calcium(777 mg) tablet 1 tablet, oral, Daily    atorvastatin (LIPITOR) 80 mg, oral, Daily    carvedilol (COREG) 6.25 mg, oral, 2 times daily (morning and late afternoon)    Dulera 100-5 mcg/actuation inhaler 2 puffs, inhalation, 2 times daily    fluticasone (Flonase) 50 mcg/actuation nasal spray 2 sprays, Each Nostril, Daily    furosemide (LASIX) 40 mg, oral, Daily    mometasone-formoterol (Dulera 200) 200-5 mcg/actuation inhaler inhalation    nitroglycerin (Nitrostat) 0.4 mg SL tablet sublingual    pantoprazole (PROTONIX) 40 mg, oral, Daily before breakfast    potassium chloride CR 10 mEq ER tablet 10 mEq, oral, Daily, Take with food.    tiotropium (Spiriva Respimat) 1.25 mcg/actuation inhaler 2 puffs, inhalation, Daily        Last Labs:  CBC  Lab Results   Component Value Date    WBC 10.0 2023    HGB 15.1 2023    HCT 48.9 2023    MCV 91 2023     2023       CMP  Lab Results   Component Value Date    CALCIUM 8.9 2023     PHOS 3.5 08/03/2018    PROT 7.3 06/08/2023    ALBUMIN 4.2 06/08/2023    AST 18 06/08/2023    ALT 13 06/08/2023    ALKPHOS 92 06/08/2023    BILITOT 1.0 06/08/2023       BMP   Lab Results   Component Value Date     06/08/2023    K 4.5 06/08/2023     06/08/2023    CO2 26 06/08/2023    GLUCOSE 108 (H) 06/08/2023    BUN 18 06/08/2023    CREATININE 0.92 06/08/2023       LIPID PANEL   Lab Results   Component Value Date    CHOL 139 06/08/2023    TRIG 116 06/08/2023    HDL 36.7 (A) 06/08/2023    CHHDL 3.8 06/08/2023    LDLF 79 06/08/2023    VLDL 23 06/08/2023       RENAL FUNCTION PANEL   Lab Results   Component Value Date    GLUCOSE 108 (H) 06/08/2023     06/08/2023    K 4.5 06/08/2023     06/08/2023    CO2 26 06/08/2023    ANIONGAP 17 06/08/2023    BUN 18 06/08/2023    CREATININE 0.92 06/08/2023    GFRMALE >90 06/08/2023    CALCIUM 8.9 06/08/2023    PHOS 3.5 08/03/2018    ALBUMIN 4.2 06/08/2023        Lab Results   Component Value Date    BNP 12 08/28/2019    HGBA1C 5.8 08/01/2018     I have reviewed the above labs & diagnostics    Last Recorded Vitals:  Vitals:    05/14/24 1427   BP: 124/78   BP Location: Left arm   Patient Position: Sitting   BP Cuff Size: Adult   Pulse: 91   SpO2: 96%   Weight: 134 kg (296 lb 3.2 oz)   Height: 1.829 m (6')     Physical Exam:  On exam Mr. Simba Washington appears his stated age, is alert and oriented x3, and in no acute distress. His sclera are anicteric and his oropharynx has moist mucous membranes. His neck is supple and without thyromegaly. The JVP is ~5 cm of water above the right atrium. His cardiac exam has regular rhythm, normal S1, S2. No S3/4. There are no murmurs. His lungs are clear to auscultation bilaterally and there is no dullness to percussion. His abdomen is soft, nontender with normoactive bowel sounds. There is no HJR. The extremities are warm and without edema. The skin is dry. There is no rash present. The distal pulses are 2-3+ in all four  extremities. His mood and affect are appropriate for todays encounter.     Assessment/Plan:  1. CAD s/p PCI LAD TARA (09/2013). EKG is baseline. No need for routine stress test based on ISCHEMIA trial.  - c/w ASA 81mg QD  - c/w Atorvastatin 80mg QD  - c/w BB  - echocardiogram next year     2. Hypertension. Well controlled     3. HLD. Tolerating high dose statin well with no side effects     4. COPD. Following with Dr. Cottrell     5. RYAN on BiPAP    Follow up in 1 year or as needed     Tracy M Schwab, APRN-CNP

## 2024-05-17 ENCOUNTER — APPOINTMENT (OUTPATIENT)
Dept: PRIMARY CARE | Facility: CLINIC | Age: 64
End: 2024-05-17
Payer: MEDICARE

## 2024-05-21 DIAGNOSIS — R60.9 EDEMA, UNSPECIFIED TYPE: ICD-10-CM

## 2024-05-22 DIAGNOSIS — R60.9 EDEMA, UNSPECIFIED TYPE: ICD-10-CM

## 2024-05-22 RX ORDER — FUROSEMIDE 40 MG/1
40 TABLET ORAL DAILY
Qty: 90 TABLET | Refills: 0 | OUTPATIENT
Start: 2024-05-22

## 2024-05-22 RX ORDER — FUROSEMIDE 40 MG/1
40 TABLET ORAL DAILY
Qty: 90 TABLET | Refills: 0 | Status: SHIPPED | OUTPATIENT
Start: 2024-05-22 | End: 2024-05-31 | Stop reason: SDUPTHER

## 2024-05-23 DIAGNOSIS — J41.8 MIXED SIMPLE AND MUCOPURULENT CHRONIC BRONCHITIS (MULTI): ICD-10-CM

## 2024-05-23 RX ORDER — MOMETASONE FUROATE AND FORMOTEROL FUMARATE DIHYDRATE 100; 5 UG/1; UG/1
2 AEROSOL RESPIRATORY (INHALATION) 2 TIMES DAILY
Qty: 39 G | Refills: 3 | Status: SHIPPED | OUTPATIENT
Start: 2024-05-23 | End: 2024-05-23 | Stop reason: SDUPTHER

## 2024-05-23 NOTE — TELEPHONE ENCOUNTER
.    Script went to [facility].    Patient wanted it to go to [facility].    Pended below.    Please file.

## 2024-05-24 RX ORDER — MOMETASONE FUROATE AND FORMOTEROL FUMARATE DIHYDRATE 100; 5 UG/1; UG/1
2 AEROSOL RESPIRATORY (INHALATION) 2 TIMES DAILY
Qty: 39 G | Refills: 3 | Status: SHIPPED | OUTPATIENT
Start: 2024-05-24 | End: 2025-05-24

## 2024-05-31 ENCOUNTER — LAB (OUTPATIENT)
Dept: LAB | Facility: LAB | Age: 64
End: 2024-05-31
Payer: MEDICARE

## 2024-05-31 ENCOUNTER — OFFICE VISIT (OUTPATIENT)
Dept: PRIMARY CARE | Facility: CLINIC | Age: 64
End: 2024-05-31
Payer: MEDICARE

## 2024-05-31 VITALS
BODY MASS INDEX: 40.66 KG/M2 | TEMPERATURE: 97.4 F | DIASTOLIC BLOOD PRESSURE: 78 MMHG | HEIGHT: 72 IN | OXYGEN SATURATION: 98 % | WEIGHT: 300.2 LBS | RESPIRATION RATE: 18 BRPM | SYSTOLIC BLOOD PRESSURE: 110 MMHG | HEART RATE: 76 BPM

## 2024-05-31 DIAGNOSIS — R73.9 HYPERGLYCEMIA: ICD-10-CM

## 2024-05-31 DIAGNOSIS — I73.9 CLAUDICATION (CMS-HCC): ICD-10-CM

## 2024-05-31 DIAGNOSIS — E78.2 MIXED HYPERLIPIDEMIA: ICD-10-CM

## 2024-05-31 DIAGNOSIS — Z00.00 MEDICARE WELCOME EXAM: Primary | ICD-10-CM

## 2024-05-31 DIAGNOSIS — R53.82 CHRONIC FATIGUE: ICD-10-CM

## 2024-05-31 DIAGNOSIS — I20.89 EFFORT ANGINA (CMS-HCC): ICD-10-CM

## 2024-05-31 DIAGNOSIS — J41.8 MIXED SIMPLE AND MUCOPURULENT CHRONIC BRONCHITIS (MULTI): ICD-10-CM

## 2024-05-31 DIAGNOSIS — Z12.5 SCREENING FOR PROSTATE CANCER: ICD-10-CM

## 2024-05-31 DIAGNOSIS — E87.6 HYPOKALEMIA: ICD-10-CM

## 2024-05-31 DIAGNOSIS — G47.33 OBSTRUCTIVE SLEEP APNEA OF ADULT: ICD-10-CM

## 2024-05-31 DIAGNOSIS — I73.9 PAD (PERIPHERAL ARTERY DISEASE) (CMS-HCC): ICD-10-CM

## 2024-05-31 DIAGNOSIS — R60.9 EDEMA, UNSPECIFIED TYPE: ICD-10-CM

## 2024-05-31 DIAGNOSIS — I10 ESSENTIAL HYPERTENSION: ICD-10-CM

## 2024-05-31 DIAGNOSIS — E66.01 CLASS 3 SEVERE OBESITY DUE TO EXCESS CALORIES WITH SERIOUS COMORBIDITY AND BODY MASS INDEX (BMI) OF 40.0 TO 44.9 IN ADULT (MULTI): ICD-10-CM

## 2024-05-31 PROBLEM — E66.813 CLASS 3 SEVERE OBESITY DUE TO EXCESS CALORIES WITH SERIOUS COMORBIDITY AND BODY MASS INDEX (BMI) OF 40.0 TO 44.9 IN ADULT: Status: ACTIVE | Noted: 2023-06-08

## 2024-05-31 PROBLEM — E66.9 OBESITY (BMI 35.0-39.9 WITHOUT COMORBIDITY): Status: RESOLVED | Noted: 2023-09-15 | Resolved: 2024-05-31

## 2024-05-31 LAB
ALBUMIN SERPL BCP-MCNC: 4.3 G/DL (ref 3.4–5)
ALP SERPL-CCNC: 88 U/L (ref 33–136)
ALT SERPL W P-5'-P-CCNC: 16 U/L (ref 10–52)
ANION GAP SERPL CALC-SCNC: 10 MMOL/L (ref 10–20)
AST SERPL W P-5'-P-CCNC: 17 U/L (ref 9–39)
BASOPHILS # BLD AUTO: 0.05 X10*3/UL (ref 0–0.1)
BASOPHILS NFR BLD AUTO: 0.6 %
BILIRUB SERPL-MCNC: 0.8 MG/DL (ref 0–1.2)
BUN SERPL-MCNC: 16 MG/DL (ref 6–23)
CALCIUM SERPL-MCNC: 9 MG/DL (ref 8.6–10.3)
CHLORIDE SERPL-SCNC: 102 MMOL/L (ref 98–107)
CHOLEST SERPL-MCNC: 152 MG/DL (ref 0–199)
CHOLESTEROL/HDL RATIO: 4.1
CO2 SERPL-SCNC: 33 MMOL/L (ref 21–32)
CREAT SERPL-MCNC: 0.82 MG/DL (ref 0.5–1.3)
EGFRCR SERPLBLD CKD-EPI 2021: >90 ML/MIN/1.73M*2
EOSINOPHIL # BLD AUTO: 0.25 X10*3/UL (ref 0–0.7)
EOSINOPHIL NFR BLD AUTO: 3 %
ERYTHROCYTE [DISTWIDTH] IN BLOOD BY AUTOMATED COUNT: 14.2 % (ref 11.5–14.5)
EST. AVERAGE GLUCOSE BLD GHB EST-MCNC: 140 MG/DL
GLUCOSE SERPL-MCNC: 113 MG/DL (ref 74–99)
HBA1C MFR BLD: 6.5 %
HCT VFR BLD AUTO: 51.8 % (ref 41–52)
HDLC SERPL-MCNC: 37.4 MG/DL
HGB BLD-MCNC: 16 G/DL (ref 13.5–17.5)
IMM GRANULOCYTES # BLD AUTO: 0.04 X10*3/UL (ref 0–0.7)
IMM GRANULOCYTES NFR BLD AUTO: 0.5 % (ref 0–0.9)
LDLC SERPL CALC-MCNC: 88 MG/DL
LYMPHOCYTES # BLD AUTO: 1.62 X10*3/UL (ref 1.2–4.8)
LYMPHOCYTES NFR BLD AUTO: 19.4 %
MCH RBC QN AUTO: 28.4 PG (ref 26–34)
MCHC RBC AUTO-ENTMCNC: 30.9 G/DL (ref 32–36)
MCV RBC AUTO: 92 FL (ref 80–100)
MONOCYTES # BLD AUTO: 0.65 X10*3/UL (ref 0.1–1)
MONOCYTES NFR BLD AUTO: 7.8 %
NEUTROPHILS # BLD AUTO: 5.72 X10*3/UL (ref 1.2–7.7)
NEUTROPHILS NFR BLD AUTO: 68.7 %
NON HDL CHOLESTEROL: 115 MG/DL (ref 0–149)
NRBC BLD-RTO: 0 /100 WBCS (ref 0–0)
PLATELET # BLD AUTO: 249 X10*3/UL (ref 150–450)
POTASSIUM SERPL-SCNC: 4.3 MMOL/L (ref 3.5–5.3)
PROT SERPL-MCNC: 7.1 G/DL (ref 6.4–8.2)
PSA SERPL-MCNC: 0.63 NG/ML
RBC # BLD AUTO: 5.64 X10*6/UL (ref 4.5–5.9)
SODIUM SERPL-SCNC: 141 MMOL/L (ref 136–145)
TRIGL SERPL-MCNC: 132 MG/DL (ref 0–149)
TSH SERPL-ACNC: 1.22 MIU/L (ref 0.44–3.98)
VIT B12 SERPL-MCNC: 233 PG/ML (ref 211–911)
VLDL: 26 MG/DL (ref 0–40)
WBC # BLD AUTO: 8.3 X10*3/UL (ref 4.4–11.3)

## 2024-05-31 PROCEDURE — 1157F ADVNC CARE PLAN IN RCRD: CPT | Performed by: FAMILY MEDICINE

## 2024-05-31 PROCEDURE — 80061 LIPID PANEL: CPT

## 2024-05-31 PROCEDURE — 82607 VITAMIN B-12: CPT

## 2024-05-31 PROCEDURE — 1123F ACP DISCUSS/DSCN MKR DOCD: CPT | Performed by: FAMILY MEDICINE

## 2024-05-31 PROCEDURE — 3074F SYST BP LT 130 MM HG: CPT | Performed by: FAMILY MEDICINE

## 2024-05-31 PROCEDURE — 99214 OFFICE O/P EST MOD 30 MIN: CPT | Performed by: FAMILY MEDICINE

## 2024-05-31 PROCEDURE — 1170F FXNL STATUS ASSESSED: CPT | Performed by: FAMILY MEDICINE

## 2024-05-31 PROCEDURE — 36415 COLL VENOUS BLD VENIPUNCTURE: CPT

## 2024-05-31 PROCEDURE — 84443 ASSAY THYROID STIM HORMONE: CPT

## 2024-05-31 PROCEDURE — G0103 PSA SCREENING: HCPCS

## 2024-05-31 PROCEDURE — 1158F ADVNC CARE PLAN TLK DOCD: CPT | Performed by: FAMILY MEDICINE

## 2024-05-31 PROCEDURE — 3008F BODY MASS INDEX DOCD: CPT | Performed by: FAMILY MEDICINE

## 2024-05-31 PROCEDURE — G0402 INITIAL PREVENTIVE EXAM: HCPCS | Performed by: FAMILY MEDICINE

## 2024-05-31 PROCEDURE — 85025 COMPLETE CBC W/AUTO DIFF WBC: CPT

## 2024-05-31 PROCEDURE — 1160F RVW MEDS BY RX/DR IN RCRD: CPT | Performed by: FAMILY MEDICINE

## 2024-05-31 PROCEDURE — 3078F DIAST BP <80 MM HG: CPT | Performed by: FAMILY MEDICINE

## 2024-05-31 PROCEDURE — 1159F MED LIST DOCD IN RCRD: CPT | Performed by: FAMILY MEDICINE

## 2024-05-31 PROCEDURE — 83036 HEMOGLOBIN GLYCOSYLATED A1C: CPT

## 2024-05-31 PROCEDURE — 80053 COMPREHEN METABOLIC PANEL: CPT

## 2024-05-31 RX ORDER — SEMAGLUTIDE 0.5 MG/.5ML
0.5 INJECTION, SOLUTION SUBCUTANEOUS
Qty: 6 ML | Refills: 0 | Status: SHIPPED | OUTPATIENT
Start: 2024-06-02 | End: 2024-06-11 | Stop reason: ALTCHOICE

## 2024-05-31 RX ORDER — FUROSEMIDE 40 MG/1
40 TABLET ORAL DAILY
Qty: 90 TABLET | Refills: 3 | Status: SHIPPED | OUTPATIENT
Start: 2024-05-31

## 2024-05-31 RX ORDER — POTASSIUM CHLORIDE 750 MG/1
10 TABLET, FILM COATED, EXTENDED RELEASE ORAL DAILY
Qty: 90 TABLET | Refills: 1 | Status: SHIPPED | OUTPATIENT
Start: 2024-05-31

## 2024-05-31 RX ORDER — SEMAGLUTIDE 0.25 MG/.5ML
0.25 INJECTION, SOLUTION SUBCUTANEOUS
Qty: 2 ML | Refills: 0 | Status: SHIPPED | OUTPATIENT
Start: 2024-06-02 | End: 2024-06-11 | Stop reason: ALTCHOICE

## 2024-05-31 ASSESSMENT — ENCOUNTER SYMPTOMS
HEADACHES: 0
VOMITING: 0
NAUSEA: 0
WEAKNESS: 0
FATIGUE: 1
NUMBNESS: 0

## 2024-05-31 ASSESSMENT — PATIENT HEALTH QUESTIONNAIRE - PHQ9
SUM OF ALL RESPONSES TO PHQ9 QUESTIONS 1 AND 2: 0
2. FEELING DOWN, DEPRESSED OR HOPELESS: NOT AT ALL
1. LITTLE INTEREST OR PLEASURE IN DOING THINGS: NOT AT ALL

## 2024-05-31 ASSESSMENT — VISUAL ACUITY
OD_CC: 20/20
OS_CC: 20/25

## 2024-05-31 ASSESSMENT — ACTIVITIES OF DAILY LIVING (ADL)
TAKING_MEDICATION: INDEPENDENT
DOING_HOUSEWORK: INDEPENDENT
GROCERY_SHOPPING: INDEPENDENT
MANAGING_FINANCES: INDEPENDENT
DRESSING: INDEPENDENT
BATHING: INDEPENDENT

## 2024-05-31 NOTE — PROGRESS NOTES
Subjective   Patient ID: Simba Washington is a 64 y.o. male who presents for Welcome To Medicare, Follow-up (Hypertension, hyperlipidemia ), and Fatigue.    He presents for Annual Medicare Wellness Visit and for follow-up on hypertension, hyperlipidemia, COPD.  He also complains of fatigue.    Patient is here for follow-up on hypertension and hyperlipidemia. He is exercising and is adherent to a low-salt diet. Patient denies chest pain, dyspnea, exertional chest pressure/discomfort, near-syncope, orthopnea, palpitations, paroxysmal nocturnal dyspnea, and syncope. Taking his medication regularly with no side effects.    Fatigue  This is a new problem. The current episode started more than 1 month ago. The problem occurs constantly. The problem has been gradually worsening. Associated symptoms include fatigue. Pertinent negatives include no abdominal pain, chest pain, chills, congestion, coughing, fever, headaches, nausea, numbness, sore throat, vomiting or weakness. Nothing aggravates the symptoms. He has tried nothing for the symptoms. The treatment provided no relief.     Review of Systems   Constitutional:  Positive for fatigue. Negative for chills and fever.   HENT:  Negative for congestion, ear pain, nosebleeds, rhinorrhea and sore throat.    Respiratory:  Negative for cough, shortness of breath and wheezing.    Cardiovascular:  Negative for chest pain, palpitations and leg swelling.   Gastrointestinal:  Negative for abdominal pain, constipation, diarrhea, nausea and vomiting.   Genitourinary:  Negative for dysuria, frequency and hematuria.   Neurological:  Negative for dizziness, tremors, weakness, numbness and headaches.     Past Medical, Surgical, and Family History reviewed and updated in chart.    Reviewed all medications by prescribing practitioner or clinical pharmacist (such as prescriptions, OTCs, herbal therapies and supplements) and documented in the medical record.    Patient Self Assessment of Health  Status  Patient Self Assessment: Excellent    Nutrition and Exercise  Current Diet: Well Balanced Diet  Adequate Fluid Intake: Yes  Caffeine: No  Exercise Frequency: Regularly    Functional Ability/Level of Safety  Cognitive Impairment Observed: No cognitive impairment observed  Cognitive Impairment Reported: No cognitive impairment reported by patient or family    Home Safety Risk Factors: None    Objective   /78 (BP Location: Left arm, Patient Position: Sitting)   Pulse 76   Temp 36.3 °C (97.4 °F)   Resp 18   Ht 1.829 m (6')   Wt 136 kg (300 lb 3.2 oz)   SpO2 98%   BMI 40.71 kg/m²     Physical Exam  Constitutional:       General: He is not in acute distress.     Appearance: Normal appearance.   HENT:      Head: Normocephalic and atraumatic.      Mouth/Throat:      Mouth: Mucous membranes are moist.      Pharynx: Oropharynx is clear. No oropharyngeal exudate or posterior oropharyngeal erythema.   Eyes:      General: No scleral icterus.     Extraocular Movements: Extraocular movements intact.      Pupils: Pupils are equal, round, and reactive to light.   Cardiovascular:      Rate and Rhythm: Normal rate and regular rhythm.      Pulses: Normal pulses.      Heart sounds: No murmur heard.     No friction rub. No gallop.   Pulmonary:      Effort: Pulmonary effort is normal.      Breath sounds: No wheezing, rhonchi or rales.   Skin:     General: Skin is warm.      Coloration: Skin is not jaundiced or pale.      Findings: No erythema or rash.   Neurological:      General: No focal deficit present.      Mental Status: He is alert and oriented to person, place, and time.      Cranial Nerves: No cranial nerve deficit.      Sensory: No sensory deficit.      Coordination: Coordination normal.      Gait: Gait normal.         Assessment/Plan   Problem List Items Addressed This Visit       Claudication (CMS-HCC)     Chronic Condition Documentation : Stable based on symptoms and exam.  Continue established treatment  "plan and follow-up at least yearly.           COPD (chronic obstructive pulmonary disease) (Multi)     Chronic Condition Documentation : Stable based on symptoms and exam.  Continue established treatment plan and follow-up at least yearly.           Edema    Relevant Medications    furosemide (Lasix) 40 mg tablet    Effort angina (CMS-[facility])     Chronic Condition Documentation : Stable based on symptoms and exam.  Continue established treatment plan and follow-up at least yearly.           Essential hypertension     Dietary sodium restriction.  Regular aerobic exercise program is recommended to help achieve and maintain normal body weight, fitness and improve lipid balance. .  Dietary changes: Increase soluble fiber  Plant sterols 2grams per day (e.g. Benecol)  Reduce saturated fat, \"trans\" monounsaturated fatty acids, and cholesterol           Relevant Medications    semaglutide, weight loss, (Wegovy) 0.25 mg/0.5 mL pen injector (Start on 6/2/2024)    semaglutide, weight loss, (Wegovy) 0.5 mg/0.5 mL pen injector (Start on 6/2/2024)    Other Relevant Orders    CBC and Auto Differential (Completed)    Comprehensive metabolic panel (Completed)    Lipid panel (Completed)    Hypokalemia    Relevant Medications    potassium chloride CR 10 mEq ER tablet    Mixed hyperlipidemia     The nature of cardiac risk has been fully discussed with this patient. Discussed cardiovascular risk analysis and appropriate diet with the need for lifelong measures to reduce the risk. A regular exercise program is recommended to help achieve and maintain normal body weight, fitness and improve lipid balance. Patient education provided. They understand and agree with this course of treatment. They will return with new or worsening symptoms. Patient instructed to remain current with appropriate annual health maintenance.            Relevant Medications    semaglutide, weight loss, (Wegovy) 0.25 mg/0.5 mL pen injector (Start on 6/2/2024)    " semaglutide, weight loss, (Wegovy) 0.5 mg/0.5 mL pen injector (Start on 6/2/2024)    Other Relevant Orders    CBC and Auto Differential (Completed)    Comprehensive metabolic panel (Completed)    Lipid panel (Completed)    Obstructive sleep apnea of adult    PAD (peripheral artery disease) (CMS-HCC)     Chronic Condition Documentation : Stable based on symptoms and exam.  Continue established treatment plan and follow-up at least yearly.           Class 3 severe obesity due to excess calories with serious comorbidity and body mass index (BMI) of 40.0 to 44.9 in adult (Multi)     Continue decrease calorie diet and not more than 1500 calorie per day diet and low-fat diet.  Continue with regular exercise program.  We advised exercise at least 5 days a week for at least 45 minutes and also a minimum of 10,000 steps a day.  The detrimental effects of obesity on health were discussed.           Relevant Medications    semaglutide, weight loss, (Wegovy) 0.25 mg/0.5 mL pen injector (Start on 6/2/2024)    semaglutide, weight loss, (Wegovy) 0.5 mg/0.5 mL pen injector (Start on 6/2/2024)    Screening for prostate cancer    Relevant Orders    Prostate Spec.Ag,Screen (Completed)    Hyperglycemia    Relevant Orders    Hemoglobin A1c (Completed)    Chronic fatigue    Relevant Orders    TSH with reflex to Free T4 if abnormal (Completed)    Vitamin B12 (Completed)     Other Visit Diagnoses       Medicare welcome exam    -  Primary          Scribe Attestation  By signing my name below, IAmanda Scribe   attest that this documentation has been prepared under the direction and in the presence of Usama Giang MD.

## 2024-05-31 NOTE — PATIENT INSTRUCTIONS
BMI was above normal measurement. Current weight: 136 kg (300 lb 3.2 oz)  Weight change since last visit (-) denotes wt loss 4 lbs   Weight loss needed to achieve BMI 25: 116.3 Lbs  Weight loss needed to achieve BMI 30: 79.5 Lbs  Provided instructions on dietary changes  Provided instructions on exercise  Advised to Increase physical activity.

## 2024-06-01 ASSESSMENT — ENCOUNTER SYMPTOMS
SHORTNESS OF BREATH: 0
WHEEZING: 0
RHINORRHEA: 0
CONSTIPATION: 0
DYSURIA: 0
FREQUENCY: 0
TREMORS: 0
COUGH: 0
PALPITATIONS: 0
DIARRHEA: 0
ABDOMINAL PAIN: 0
CHILLS: 0
DIZZINESS: 0
HEMATURIA: 0
SORE THROAT: 0
FEVER: 0

## 2024-06-03 ENCOUNTER — TELEPHONE (OUTPATIENT)
Dept: PRIMARY CARE | Facility: CLINIC | Age: 64
End: 2024-06-03

## 2024-06-03 ENCOUNTER — LAB (OUTPATIENT)
Dept: LAB | Facility: LAB | Age: 64
End: 2024-06-03
Payer: MEDICARE

## 2024-06-03 DIAGNOSIS — R73.9 HYPERGLYCEMIA: ICD-10-CM

## 2024-06-03 LAB
EST. AVERAGE GLUCOSE BLD GHB EST-MCNC: 140 MG/DL
HBA1C MFR BLD: 6.5 %

## 2024-06-03 PROCEDURE — 83036 HEMOGLOBIN GLYCOSYLATED A1C: CPT

## 2024-06-03 PROCEDURE — 36415 COLL VENOUS BLD VENIPUNCTURE: CPT

## 2024-06-03 NOTE — TELEPHONE ENCOUNTER
Simba was just here this morning and received a script for wegovy however he found out his insurance covers ozempic and was wondering if dr. Giang could switch him to ozempic in the same dosage as the wegovy. Please advise.

## 2024-06-03 NOTE — TELEPHONE ENCOUNTER
Per AP, this is on hold until today's (06.03.2024)  A1C result comes back. Pt was made aware and understood.

## 2024-06-03 NOTE — TELEPHONE ENCOUNTER
We received a request for prior authorization on the patient's   semaglutide, weight loss, (Wegovy) 0.25 mg/0.5 mL pen injector   from their pharmacy. Prior authorization was submitted to insurance today. We will await their determination.

## 2024-06-04 ENCOUNTER — OFFICE VISIT (OUTPATIENT)
Dept: PULMONOLOGY | Facility: CLINIC | Age: 64
End: 2024-06-04
Payer: MEDICARE

## 2024-06-04 VITALS
HEART RATE: 96 BPM | DIASTOLIC BLOOD PRESSURE: 87 MMHG | BODY MASS INDEX: 40.36 KG/M2 | OXYGEN SATURATION: 92 % | WEIGHT: 298 LBS | TEMPERATURE: 97 F | HEIGHT: 72 IN | SYSTOLIC BLOOD PRESSURE: 132 MMHG

## 2024-06-04 DIAGNOSIS — J96.11 CHRONIC RESPIRATORY FAILURE WITH HYPOXIA (MULTI): ICD-10-CM

## 2024-06-04 DIAGNOSIS — J41.8 MIXED SIMPLE AND MUCOPURULENT CHRONIC BRONCHITIS (MULTI): Primary | ICD-10-CM

## 2024-06-04 DIAGNOSIS — E66.3 OVERWEIGHT: ICD-10-CM

## 2024-06-04 PROCEDURE — 3075F SYST BP GE 130 - 139MM HG: CPT | Performed by: INTERNAL MEDICINE

## 2024-06-04 PROCEDURE — 1157F ADVNC CARE PLAN IN RCRD: CPT | Performed by: INTERNAL MEDICINE

## 2024-06-04 PROCEDURE — 1123F ACP DISCUSS/DSCN MKR DOCD: CPT | Performed by: INTERNAL MEDICINE

## 2024-06-04 PROCEDURE — 1159F MED LIST DOCD IN RCRD: CPT | Performed by: INTERNAL MEDICINE

## 2024-06-04 PROCEDURE — 3048F LDL-C <100 MG/DL: CPT | Performed by: INTERNAL MEDICINE

## 2024-06-04 PROCEDURE — 3079F DIAST BP 80-89 MM HG: CPT | Performed by: INTERNAL MEDICINE

## 2024-06-04 PROCEDURE — 3044F HG A1C LEVEL LT 7.0%: CPT | Performed by: INTERNAL MEDICINE

## 2024-06-04 PROCEDURE — 99214 OFFICE O/P EST MOD 30 MIN: CPT | Performed by: INTERNAL MEDICINE

## 2024-06-04 PROCEDURE — 1126F AMNT PAIN NOTED NONE PRSNT: CPT | Performed by: INTERNAL MEDICINE

## 2024-06-04 PROCEDURE — 3008F BODY MASS INDEX DOCD: CPT | Performed by: INTERNAL MEDICINE

## 2024-06-04 ASSESSMENT — PATIENT HEALTH QUESTIONNAIRE - PHQ9
2. FEELING DOWN, DEPRESSED OR HOPELESS: NOT AT ALL
SUM OF ALL RESPONSES TO PHQ9 QUESTIONS 1 AND 2: 0
1. LITTLE INTEREST OR PLEASURE IN DOING THINGS: NOT AT ALL

## 2024-06-04 ASSESSMENT — ENCOUNTER SYMPTOMS
OCCASIONAL FEELINGS OF UNSTEADINESS: 0
LOSS OF SENSATION IN FEET: 0
DEPRESSION: 0

## 2024-06-04 ASSESSMENT — PAIN SCALES - GENERAL: PAINLEVEL: 0-NO PAIN

## 2024-06-04 NOTE — TELEPHONE ENCOUNTER
LUÍS BOB did run a PA for Ozempic with his new diagnosis of Type two diabetes for Ozempic to be proactive and received an approval. If prescribed at appointment it should not be an issue.     Request Reference Number: [id number]. OZEMPIC INJ 2MG/3ML is approved through 12/31/2024. Your patient may now fill this prescription and it will be covered.. Authorization Expiration Date: December 31, 2024.

## 2024-06-06 ENCOUNTER — TELEPHONE (OUTPATIENT)
Dept: SLEEP MEDICINE | Facility: CLINIC | Age: 64
End: 2024-06-06
Payer: MEDICARE

## 2024-06-06 ASSESSMENT — ENCOUNTER SYMPTOMS
CONFUSION: 0
BLACKOUTS: 0
DIZZINESS: 0

## 2024-06-06 NOTE — TELEPHONE ENCOUNTER
Deirdre from [facility] is asking to fax over the chart notes from the visit on 6/4/24.    I looked in chart review and didn't see not notes from 6/4/24 but checked to make sure the pt did come into the office     Please advise

## 2024-06-11 ENCOUNTER — OFFICE VISIT (OUTPATIENT)
Dept: PRIMARY CARE | Facility: CLINIC | Age: 64
End: 2024-06-11
Payer: MEDICARE

## 2024-06-11 VITALS
BODY MASS INDEX: 40.71 KG/M2 | OXYGEN SATURATION: 94 % | HEIGHT: 72 IN | WEIGHT: 300.6 LBS | SYSTOLIC BLOOD PRESSURE: 124 MMHG | HEART RATE: 80 BPM | DIASTOLIC BLOOD PRESSURE: 80 MMHG | RESPIRATION RATE: 18 BRPM | TEMPERATURE: 97.8 F

## 2024-06-11 DIAGNOSIS — E66.01 MORBID OBESITY WITH BMI OF 40.0-44.9, ADULT (MULTI): ICD-10-CM

## 2024-06-11 DIAGNOSIS — E11.9 NEW ONSET TYPE 2 DIABETES MELLITUS (MULTI): Primary | ICD-10-CM

## 2024-06-11 DIAGNOSIS — I10 ESSENTIAL HYPERTENSION: ICD-10-CM

## 2024-06-11 DIAGNOSIS — E78.2 MIXED HYPERLIPIDEMIA: ICD-10-CM

## 2024-06-11 PROCEDURE — 3008F BODY MASS INDEX DOCD: CPT | Performed by: FAMILY MEDICINE

## 2024-06-11 PROCEDURE — 3044F HG A1C LEVEL LT 7.0%: CPT | Performed by: FAMILY MEDICINE

## 2024-06-11 PROCEDURE — 1159F MED LIST DOCD IN RCRD: CPT | Performed by: FAMILY MEDICINE

## 2024-06-11 PROCEDURE — 1123F ACP DISCUSS/DSCN MKR DOCD: CPT | Performed by: FAMILY MEDICINE

## 2024-06-11 PROCEDURE — 1036F TOBACCO NON-USER: CPT | Performed by: FAMILY MEDICINE

## 2024-06-11 PROCEDURE — 1160F RVW MEDS BY RX/DR IN RCRD: CPT | Performed by: FAMILY MEDICINE

## 2024-06-11 PROCEDURE — 99213 OFFICE O/P EST LOW 20 MIN: CPT | Performed by: FAMILY MEDICINE

## 2024-06-11 PROCEDURE — 3074F SYST BP LT 130 MM HG: CPT | Performed by: FAMILY MEDICINE

## 2024-06-11 PROCEDURE — 1157F ADVNC CARE PLAN IN RCRD: CPT | Performed by: FAMILY MEDICINE

## 2024-06-11 PROCEDURE — 3048F LDL-C <100 MG/DL: CPT | Performed by: FAMILY MEDICINE

## 2024-06-11 PROCEDURE — 3079F DIAST BP 80-89 MM HG: CPT | Performed by: FAMILY MEDICINE

## 2024-06-11 RX ORDER — LANCETS 30 GAUGE
EACH MISCELLANEOUS
Qty: 1 EACH | Refills: 0 | Status: SHIPPED | OUTPATIENT
Start: 2024-06-11

## 2024-06-11 RX ORDER — SEMAGLUTIDE 0.68 MG/ML
0.5 INJECTION, SOLUTION SUBCUTANEOUS
Qty: 9 ML | Refills: 0 | Status: SHIPPED | OUTPATIENT
Start: 2024-06-11

## 2024-06-11 RX ORDER — BLOOD SUGAR DIAGNOSTIC
STRIP MISCELLANEOUS
Qty: 100 STRIP | Refills: 3 | Status: SHIPPED | OUTPATIENT
Start: 2024-06-11

## 2024-06-11 RX ORDER — LANCETS 33 GAUGE
EACH MISCELLANEOUS
Qty: 100 EACH | Refills: 3 | Status: SHIPPED | OUTPATIENT
Start: 2024-06-11

## 2024-06-11 ASSESSMENT — ENCOUNTER SYMPTOMS
VOMITING: 0
ABDOMINAL PAIN: 0
DIZZINESS: 0
BLACKOUTS: 0
SHORTNESS OF BREATH: 0
SORE THROAT: 0
NUMBNESS: 0
CONSTIPATION: 0
FEVER: 0
DIARRHEA: 0
RHINORRHEA: 0
POLYDIPSIA: 0
COUGH: 0
FREQUENCY: 0
TREMORS: 0
HEADACHES: 0
PALPITATIONS: 0
DYSURIA: 0
BLURRED VISION: 0
FATIGUE: 0
WEAKNESS: 0
VISUAL CHANGE: 0
WHEEZING: 0
POLYPHAGIA: 0
CHILLS: 0
HEMATURIA: 0

## 2024-06-11 ASSESSMENT — PATIENT HEALTH QUESTIONNAIRE - PHQ9
1. LITTLE INTEREST OR PLEASURE IN DOING THINGS: NOT AT ALL
2. FEELING DOWN, DEPRESSED OR HOPELESS: NOT AT ALL
SUM OF ALL RESPONSES TO PHQ9 QUESTIONS 1 AND 2: 0

## 2024-06-11 NOTE — ASSESSMENT & PLAN NOTE
Diabetes Mellitus type II, New Onset.  1. Rx changes: We will start him on Ozempic 0.25 mg weekly for 1 month then increase Ozempic to 0.5 mg weekly.  2. Education: Reviewed ‘ABCs’ of diabetes management (respective goals in parentheses):  A1C (<7), blood pressure (<130/80), and cholesterol (LDL <100).  3. Compliance at present is estimated to be fair. Efforts to improve compliance (if necessary) will be directed at dietary modifications: and increased exercise.  4. Follow up: 4 months

## 2024-06-11 NOTE — PROGRESS NOTES
Subjective   Patient ID: Simba Washington is a 64 y.o. male who presents for Follow-up (New Onset Diabetes and labs).    Diabetes  He presents for his initial diabetic visit. He has type 2 diabetes mellitus. There are no hypoglycemic associated symptoms. Pertinent negatives for hypoglycemia include no dizziness, headaches or tremors. Pertinent negatives for diabetes include no blurred vision, no chest pain, no fatigue, no foot paresthesias, no foot ulcerations, no polydipsia, no polyphagia, no polyuria, no visual change and no weakness. Pertinent negatives for hypoglycemia complications include no blackouts and no hospitalization. Diabetic complications include heart disease. Pertinent negatives for diabetic complications include no CVA. Risk factors for coronary artery disease include dyslipidemia, hypertension, obesity, sedentary lifestyle, diabetes mellitus and male sex. He does not see a podiatrist.Eye exam is current.      Review of Systems   Constitutional:  Negative for chills, fatigue and fever.   HENT:  Negative for congestion, ear pain, nosebleeds, rhinorrhea and sore throat.    Eyes:  Negative for blurred vision.   Respiratory:  Negative for cough, shortness of breath and wheezing.    Cardiovascular:  Negative for chest pain, palpitations and leg swelling.   Gastrointestinal:  Negative for abdominal pain, constipation, diarrhea and vomiting.   Endocrine: Negative for polydipsia, polyphagia and polyuria.   Genitourinary:  Negative for dysuria, frequency and hematuria.   Neurological:  Negative for dizziness, tremors, weakness, numbness and headaches.     Objective   /80 (BP Location: Left arm, Patient Position: Sitting)   Pulse 80   Temp 36.6 °C (97.8 °F)   Resp 18   Ht 1.829 m (6')   Wt 136 kg (300 lb 9.6 oz)   SpO2 94%   BMI 40.77 kg/m²     Physical Exam  Constitutional:       General: He is not in acute distress.     Appearance: Normal appearance.   HENT:      Head: Normocephalic and  atraumatic.      Mouth/Throat:      Mouth: Mucous membranes are moist.      Pharynx: Oropharynx is clear. No oropharyngeal exudate or posterior oropharyngeal erythema.   Eyes:      General: No scleral icterus.     Extraocular Movements: Extraocular movements intact.      Pupils: Pupils are equal, round, and reactive to light.   Cardiovascular:      Rate and Rhythm: Normal rate and regular rhythm.      Pulses: Normal pulses.      Heart sounds: No murmur heard.     No friction rub. No gallop.   Pulmonary:      Effort: Pulmonary effort is normal.      Breath sounds: No wheezing, rhonchi or rales.   Skin:     General: Skin is warm.      Coloration: Skin is not jaundiced or pale.      Findings: No erythema or rash.   Neurological:      General: No focal deficit present.      Mental Status: He is alert and oriented to person, place, and time.      Cranial Nerves: No cranial nerve deficit.      Sensory: No sensory deficit.      Coordination: Coordination normal.      Gait: Gait normal.         Lab on 06/03/2024   Component Date Value Ref Range Status    Hemoglobin A1C 06/03/2024 6.5 (H)  see below % Final    Estimated Average Glucose 06/03/2024 140  Not Established mg/dL Final     Assessment/Plan   Problem List Items Addressed This Visit       Essential hypertension     Well-controlled, continue current medications and management.         Relevant Orders    Comprehensive Metabolic Panel    Hemoglobin A1C    Lipid Panel    Albumin , Urine Random    Follow Up In [facility] - PCP - Established    Mixed hyperlipidemia     The nature of cardiac risk has been fully discussed with this patient. Discussed cardiovascular risk analysis and appropriate diet with the need for lifelong measures to reduce the risk. A regular exercise program is recommended to help achieve and maintain normal body weight, fitness and improve lipid balance. Patient education provided. They understand and agree with this course of treatment.  They will return with new or worsening symptoms. Patient instructed to remain current with appropriate annual health maintenance.          Relevant Orders    Comprehensive Metabolic Panel    Hemoglobin A1C    Lipid Panel    Albumin , Urine Random    Follow Up In [facility] - PCP - Established    Morbid obesity with BMI of 40.0-44.9, adult (Multi)     Continue decrease calorie diet and not more than 1500 calorie per day diet and low-fat diet.  Continue with regular exercise program.  We advised exercise at least 5 days a week for at least 45 minutes and also a minimum of 10,000 steps a day.  The detrimental effects of obesity on health were discussed.         New onset type 2 diabetes mellitus (Multi) - Primary     Diabetes Mellitus type II, New Onset.  1. Rx changes: We will start him on Ozempic 0.25 mg weekly for 1 month then increase Ozempic to 0.5 mg weekly.  2. Education: Reviewed ‘ABCs’ of diabetes management (respective goals in parentheses):  A1C (<7), blood pressure (<130/80), and cholesterol (LDL <100).  3. Compliance at present is estimated to be fair. Efforts to improve compliance (if necessary) will be directed at dietary modifications: and increased exercise.  4. Follow up: 4 months         Relevant Medications    semaglutide (Ozempic) 0.25 mg or 0.5 mg (2 mg/3 mL) pen injector    OneTouch Delica Plus Lancet 33 gauge misc    OneTouch Ultra2 Meter kit    OneTouch Ultra Test strip    Other Relevant Orders    Comprehensive Metabolic Panel    Hemoglobin A1C    Lipid Panel    Albumin , Urine Random    Follow Up In [facility] - PCP - Established     Scribe Attestation  By signing my name below, IHarvey Scribe   attest that this documentation has been prepared under the direction and in the presence of Usama Giang MD.

## 2024-06-11 NOTE — PATIENT INSTRUCTIONS
BMI was above normal measurement. Current weight: 136 kg (300 lb 9.6 oz)  Weight change since last visit (-) denotes wt loss 2.6 lbs   Weight loss needed to achieve BMI 25: 116.7 Lbs  Weight loss needed to achieve BMI 30: 79.9 Lbs  Advised to Increase physical activity.

## 2024-06-12 NOTE — TELEPHONE ENCOUNTER
Faxed over office visit notes from 6/4/24 to Deirdre barnhart [facility] [place].    No further actions or concerns.

## 2024-06-12 NOTE — PROGRESS NOTES
Department of Medicine  Division of Pulmonary, Critical Care, and Sleep Medicine  Follow-Up Visit  [facility] - [address], [address]    Physician HPI:  Mr Washington is a 63-year-old male with past medical history significant for heavy tobacco use (quit 3 years ago), obesity, diastolic heart failure, COPD GOLD III and RYAN who presented to the office today for follow up.     Simba was last seen in December 2018. He reports overall stable respiratory status over the past year. No interval hx of COPD exacerbation or hospitalizations. He was tested positive for COVID19 in April. His symptoms were mild and he was isolated at home until recovery. He is using Dulera and Spiriva for COPD. He is compliant with BiPAP every night and reports to feel better with PAP therapy. Denies excessive daytime fatigue or sleepiness. He has chronic dyspnea on exertion. He is able to conduct his daily activities. No purulent sputum production now, orthopnea or LE edema.      Follow-up 11/22/2022:  No acute respiratory symptoms today no or interval history of acute COPD exacerbation or hospital admission. Using ICS/LABA as instruction. On noninvasive ventilation at nighttime.     Follow up 6/15/2023:  Respiratory status has been stable since last visit. No interval hx of COPD exacerbation. Compliant with NIV at night time.     Follow up 12/04/2023:  Simba reports stable respiratory status since last visit. No interval hx of acute COPD exacerbation.     Follow up 06/04/2024:  No acute symptoms today. Retired now. No interval hx of acute COPD exacerbation. Using NIV at night.     Immunization History   Administered Date(s) Administered    Flu vaccine (IIV4), preservative free *Check age/dose* 10/13/2014, 11/02/2020    Influenza, Unspecified 10/04/2003    Influenza, injectable, quadrivalent 10/05/2018    Influenza, seasonal, injectable 09/28/2012    Influenza, seasonal, injectable, preservative free 09/23/2011    Pneumococcal polysaccharide  vaccine, 23-valent, age 2 years and older (PNEUMOVAX 23) 10/05/2018       Current Outpatient Medications   Medication Instructions    aspirin-calcium carbonate 81 mg-300 mg calcium(777 mg) tablet 1 tablet, oral, Daily    atorvastatin (LIPITOR) 80 mg, oral, Daily    carvedilol (COREG) 6.25 mg, oral, 2 times daily (morning and late afternoon)    Dulera 100-5 mcg/actuation inhaler 2 puffs, inhalation, 2 times daily    fluticasone (Flonase) 50 mcg/actuation nasal spray 2 sprays, Each Nostril, Daily    furosemide (LASIX) 40 mg, oral, Daily    mometasone-formoterol (Dulera 200) 200-5 mcg/actuation inhaler 2 puffs, inhalation, 2 times daily RT    nitroglycerin (Nitrostat) 0.4 mg SL tablet sublingual    OneTouch Delica Plus Lancet 33 gauge misc USE THREE TIMES A DAY TO TEST BLOOD SUGAR    OneTouch Ultra Test strip Use to test blood sugar 3 times a day    OneTouch Ultra2 Meter kit USE TO TEST BLOOD SUGAR 3 TIMES A DAY    Ozempic 0.5 mg, subcutaneous, Every 7 days    potassium chloride CR 10 mEq ER tablet 10 mEq, oral, Daily, Take with food.    tiotropium (Spiriva Respimat) 1.25 mcg/actuation inhaler 2 puffs, inhalation, Daily        Allergies:  No Known Allergies       Visit Vitals  /87   Pulse 96   Temp 36.1 °C (97 °F) (Temporal)   Ht 1.829 m (6')   Wt 135 kg (298 lb)   SpO2 92%   BMI 40.42 kg/m²   Smoking Status Former   BSA 2.62 m²        Physical Exam     Constitutional: General appearance: Abnormal.  appears healthy,~overweight~and~well hydrated.   Ears, Nose, Mouth, and Throat: External inspection of ears and nose: Normal.    Pulmonary: Chest: Normal to inspection. ~Respiratory effort: No increased work of breathing or signs of respiratory distress. ~Auscultation of lungs: Clear to auscultation bilaterally.    Cardiovascular: Heart rate and rhythm were normal, normal S1 and S2, no gallops, no murmurs and no pericardial rub.~No peripheral edema.   Lymphatic: No lymphadenopathy.   Skin: Normal skin color and  pigmentation, normal skin turgor, and no rash.   Psychiatric: Judgment and insight: Intact.~Alert and oriented to person, place and time.~Mood and affect: Normal.        Chest Radiograph     XR chest 1 view 07/19/2022    Narrative  MRN: [id number]  Patient Name: MARTHA MENDIOLA    STUDY:  CHEST 1 VIEW;  7/19/2022 5:20 am    INDICATION:  Chest Pain .    COMPARISON:  Chest radiograph 08/28/2019    ACCESSION NUMBER(S):  [id number]    ORDERING CLINICIAN:  TERESA GEORGE    FINDINGS:      CARDIOMEDIASTINAL SILHOUETTE:  Cardiomediastinal silhouette is stable in size and configuration.    LUNGS:  Curvilinear opacity overlying the left lung base. No pleural effusion  or pneumothorax.    ABDOMEN:  No remarkable upper abdominal findings.    BONES:  No acute osseous abnormality.    Impression  Subsegmental atelectasis versus scarring in the left lung base.      XR CHEST 1 VIEW 08/28/2019    Narrative  MRN: [id number]  Patient Name: MARTHA MENDIOLA    STUDY:  CHEST 1 VIEW; 8/28/2019 10:06 am    INDICATION:  Chest Pain.    COMPARISON:  07/30/2018    ACCESSION NUMBER(S):  [id number]    ORDERING CLINICIAN:  DANDRE LEE    TECHNIQUE:  Portable upright frontal view of the chest    FINDINGS:  Cardiomediastinal silhouette is stable. There is mild right lower  lobe peribronchial cuffing which could be caused by an infectious or  inflammatory process. No consolidation, pleural effusion or  pneumothorax.    Impression  As above    Laboratory Studies     Lab Results   Component Value Date    WBC 8.3 05/31/2024    HGB 16.0 05/31/2024    HCT 51.8 05/31/2024    MCV 92 05/31/2024     05/31/2024      Lab Results   Component Value Date    GLUCOSE 113 (H) 05/31/2024    CALCIUM 9.0 05/31/2024     05/31/2024    K 4.3 05/31/2024    CO2 33 (H) 05/31/2024     05/31/2024    BUN 16 05/31/2024    CREATININE 0.82 05/31/2024      Lab Results   Component Value Date    ALT 16 05/31/2024    AST 17 05/31/2024    ALKPHOS 88 05/31/2024    BILITOT 0.8  05/31/2024        Protime   Date/Time Value Ref Range Status   07/19/2022 05:17 AM 12.0 9.8 - 13.4 sec Final     INR   Date/Time Value Ref Range Status   07/19/2022 05:17 AM 1.0 0.9 - 1.1 Final       Assessment and Plan / Recommendations     1. COPD GOLD III, MMRC II  2. Chronic hypoxic and hypercapnic respiratory failure  3. Obstructive sleep apnea   4. HFpEF  5. Overweight   6. Lung cancer screening         Plan:  1. Continue on current inhaler regimen  2. Continue non-invasive ventilation at night time. He is interested in repeating sleep study to re-evaluate severity of sleep apnea. The patient has obstructive sleep apnea in addition to severe COPD with Chronic hypercapnic respiratory failure. Without NIV, there is an increased risk of hospital admission with acute respiratory failure   3. Up to date with influenza and Pneumococcal vaccinations  4. Weight loss is recommended  5. LDCT for lung cancer screening is recommended but declined by the patient.         Follow-up in 12 months or sooner if necessary.        Please excuse any misspellings or unintended errors related to the Dragon speech recognition software used to dictate this note.         Shawna Villar MD   06/04/2024

## 2024-06-23 DIAGNOSIS — I25.10 ATHEROSCLEROSIS OF NATIVE CORONARY ARTERY OF NATIVE HEART WITHOUT ANGINA PECTORIS: ICD-10-CM

## 2024-06-24 RX ORDER — ATORVASTATIN CALCIUM 80 MG/1
80 TABLET, FILM COATED ORAL DAILY
Qty: 90 TABLET | Refills: 3 | Status: SHIPPED | OUTPATIENT
Start: 2024-06-24 | End: 2025-06-24

## 2024-07-22 DIAGNOSIS — E11.9 NEW ONSET TYPE 2 DIABETES MELLITUS (MULTI): ICD-10-CM

## 2024-07-22 NOTE — TELEPHONE ENCOUNTER
Simba would like his lancets and test strips sent to [facility] instead of [facility]    Rx Refill Request     Name: Simba Washington  :  1960     Date of last appointment:  2024   Date of next appointment:  10/9/2024   Best number to reach patient:  [phone]

## 2024-07-23 DIAGNOSIS — E11.9 NEW ONSET TYPE 2 DIABETES MELLITUS (MULTI): ICD-10-CM

## 2024-07-23 RX ORDER — BLOOD SUGAR DIAGNOSTIC
STRIP MISCELLANEOUS
Qty: 300 STRIP | Refills: 3 | Status: SHIPPED | OUTPATIENT
Start: 2024-07-23

## 2024-07-23 RX ORDER — LANCETS 33 GAUGE
EACH MISCELLANEOUS
Qty: 300 EACH | Refills: 3 | Status: SHIPPED | OUTPATIENT
Start: 2024-07-23

## 2024-07-29 ENCOUNTER — TELEPHONE (OUTPATIENT)
Dept: PULMONOLOGY | Facility: CLINIC | Age: 64
End: 2024-07-29
Payer: MEDICARE

## 2024-07-29 NOTE — TELEPHONE ENCOUNTER
Patient insurance does not want to cover NIV. Needs last note to state: Continue non-invasive ventilation at night time. The patient has obstructive sleep apnea in addition to severe COPD with Chronic hypercapnic respiratory failure. Without NIV, there is an increased risk of hospital admission with acute respiratory failure

## 2024-08-07 DIAGNOSIS — E11.9 NEW ONSET TYPE 2 DIABETES MELLITUS (MULTI): ICD-10-CM

## 2024-08-07 RX ORDER — LANCETS 30 GAUGE
EACH MISCELLANEOUS
Qty: 1 EACH | Refills: 0 | Status: SHIPPED | OUTPATIENT
Start: 2024-08-07

## 2024-08-07 NOTE — TELEPHONE ENCOUNTER
Rx Refill Request     Name: Naresh Oliva  :  1960   Medication Name:  one touch kit   Specific Pharmacy location:  [facility]   Date of last appointment:  24  Date of next appointment:  10/9/24  Best number to reach patient:  [phone]

## 2024-08-15 NOTE — TELEPHONE ENCOUNTER
Left detailed message note sent to Haydee brizuela [facility].   DISPLAY PLAN FREE TEXT

## 2024-08-21 ENCOUNTER — TELEPHONE (OUTPATIENT)
Dept: PULMONOLOGY | Facility: CLINIC | Age: 64
End: 2024-08-21
Payer: MEDICARE

## 2024-08-29 NOTE — TELEPHONE ENCOUNTER
Patient called in saying that he needs a new order for his in center sleep study. He was told it's . Please advise

## 2024-09-09 ENCOUNTER — TELEPHONE (OUTPATIENT)
Dept: PULMONOLOGY | Facility: CLINIC | Age: 64
End: 2024-09-09
Payer: MEDICARE

## 2024-09-09 DIAGNOSIS — G47.33 OSA TREATED WITH BIPAP: Primary | ICD-10-CM

## 2024-09-09 DIAGNOSIS — G47.33 OSA (OBSTRUCTIVE SLEEP APNEA): Primary | ICD-10-CM

## 2024-09-09 NOTE — TELEPHONE ENCOUNTER
Barbra picked up his NIV due to insurance. He called to set up sleep study but was told the order is . Can you please reorder along with ABG.

## 2024-09-16 ENCOUNTER — LAB (OUTPATIENT)
Dept: LAB | Facility: LAB | Age: 64
End: 2024-09-16
Payer: MEDICARE

## 2024-09-16 DIAGNOSIS — I10 ESSENTIAL HYPERTENSION: ICD-10-CM

## 2024-09-16 DIAGNOSIS — E11.9 NEW ONSET TYPE 2 DIABETES MELLITUS (MULTI): ICD-10-CM

## 2024-09-16 DIAGNOSIS — E78.2 MIXED HYPERLIPIDEMIA: ICD-10-CM

## 2024-09-16 LAB
ALBUMIN SERPL BCP-MCNC: 4.1 G/DL (ref 3.4–5)
ALP SERPL-CCNC: 88 U/L (ref 33–136)
ALT SERPL W P-5'-P-CCNC: 12 U/L (ref 10–52)
ANION GAP SERPL CALC-SCNC: 10 MMOL/L (ref 10–20)
AST SERPL W P-5'-P-CCNC: 14 U/L (ref 9–39)
BILIRUB SERPL-MCNC: 0.7 MG/DL (ref 0–1.2)
BUN SERPL-MCNC: 12 MG/DL (ref 6–23)
CALCIUM SERPL-MCNC: 9 MG/DL (ref 8.6–10.3)
CHLORIDE SERPL-SCNC: 100 MMOL/L (ref 98–107)
CHOLEST SERPL-MCNC: 154 MG/DL (ref 0–199)
CHOLESTEROL/HDL RATIO: 4.1
CO2 SERPL-SCNC: 33 MMOL/L (ref 21–32)
CREAT SERPL-MCNC: 0.87 MG/DL (ref 0.5–1.3)
CREAT UR-MCNC: 13.7 MG/DL (ref 20–370)
EGFRCR SERPLBLD CKD-EPI 2021: >90 ML/MIN/1.73M*2
GLUCOSE SERPL-MCNC: 109 MG/DL (ref 74–99)
HDLC SERPL-MCNC: 37.9 MG/DL
LDLC SERPL CALC-MCNC: 87 MG/DL
MICROALBUMIN UR-MCNC: <7 MG/L
MICROALBUMIN/CREAT UR: ABNORMAL MG/G{CREAT}
NON HDL CHOLESTEROL: 116 MG/DL (ref 0–149)
POTASSIUM SERPL-SCNC: 4.2 MMOL/L (ref 3.5–5.3)
PROT SERPL-MCNC: 7.1 G/DL (ref 6.4–8.2)
SODIUM SERPL-SCNC: 139 MMOL/L (ref 136–145)
TRIGL SERPL-MCNC: 145 MG/DL (ref 0–149)
VLDL: 29 MG/DL (ref 0–40)

## 2024-09-16 PROCEDURE — 82043 UR ALBUMIN QUANTITATIVE: CPT

## 2024-09-16 PROCEDURE — 36415 COLL VENOUS BLD VENIPUNCTURE: CPT

## 2024-09-16 PROCEDURE — 80061 LIPID PANEL: CPT

## 2024-09-16 PROCEDURE — 83036 HEMOGLOBIN GLYCOSYLATED A1C: CPT

## 2024-09-16 PROCEDURE — 82570 ASSAY OF URINE CREATININE: CPT

## 2024-09-16 PROCEDURE — 80053 COMPREHEN METABOLIC PANEL: CPT

## 2024-09-17 LAB
EST. AVERAGE GLUCOSE BLD GHB EST-MCNC: 120 MG/DL
HBA1C MFR BLD: 5.8 %

## 2024-09-25 DIAGNOSIS — Z98.61 CAD S/P PERCUTANEOUS CORONARY ANGIOPLASTY: ICD-10-CM

## 2024-09-25 DIAGNOSIS — I25.10 CAD S/P PERCUTANEOUS CORONARY ANGIOPLASTY: ICD-10-CM

## 2024-09-26 RX ORDER — CARVEDILOL 6.25 MG/1
6.25 TABLET ORAL
Qty: 180 TABLET | Refills: 3 | Status: SHIPPED | OUTPATIENT
Start: 2024-09-26

## 2024-10-09 ENCOUNTER — APPOINTMENT (OUTPATIENT)
Dept: PRIMARY CARE | Facility: CLINIC | Age: 64
End: 2024-10-09
Payer: MEDICARE

## 2024-10-09 VITALS
OXYGEN SATURATION: 95 % | DIASTOLIC BLOOD PRESSURE: 68 MMHG | SYSTOLIC BLOOD PRESSURE: 110 MMHG | WEIGHT: 285 LBS | RESPIRATION RATE: 18 BRPM | HEART RATE: 81 BPM | BODY MASS INDEX: 38.6 KG/M2 | HEIGHT: 72 IN | TEMPERATURE: 97.6 F

## 2024-10-09 DIAGNOSIS — E78.2 MIXED HYPERLIPIDEMIA: ICD-10-CM

## 2024-10-09 DIAGNOSIS — E11.9 TYPE 2 DIABETES MELLITUS WITHOUT COMPLICATION, WITHOUT LONG-TERM CURRENT USE OF INSULIN (MULTI): ICD-10-CM

## 2024-10-09 DIAGNOSIS — Z00.00 ANNUAL PHYSICAL EXAM: Primary | ICD-10-CM

## 2024-10-09 DIAGNOSIS — E66.01 CLASS 2 SEVERE OBESITY DUE TO EXCESS CALORIES WITH SERIOUS COMORBIDITY AND BODY MASS INDEX (BMI) OF 38.0 TO 38.9 IN ADULT: ICD-10-CM

## 2024-10-09 DIAGNOSIS — E66.812 CLASS 2 SEVERE OBESITY DUE TO EXCESS CALORIES WITH SERIOUS COMORBIDITY AND BODY MASS INDEX (BMI) OF 38.0 TO 38.9 IN ADULT: ICD-10-CM

## 2024-10-09 DIAGNOSIS — I25.10 ATHEROSCLEROSIS OF NATIVE CORONARY ARTERY OF NATIVE HEART WITHOUT ANGINA PECTORIS: ICD-10-CM

## 2024-10-09 DIAGNOSIS — I10 ESSENTIAL HYPERTENSION: ICD-10-CM

## 2024-10-09 DIAGNOSIS — J44.9 CHRONIC OBSTRUCTIVE PULMONARY DISEASE, UNSPECIFIED COPD TYPE (MULTI): ICD-10-CM

## 2024-10-09 PROCEDURE — 1036F TOBACCO NON-USER: CPT | Performed by: FAMILY MEDICINE

## 2024-10-09 PROCEDURE — 1159F MED LIST DOCD IN RCRD: CPT | Performed by: FAMILY MEDICINE

## 2024-10-09 PROCEDURE — 99397 PER PM REEVAL EST PAT 65+ YR: CPT | Performed by: FAMILY MEDICINE

## 2024-10-09 PROCEDURE — 3008F BODY MASS INDEX DOCD: CPT | Performed by: FAMILY MEDICINE

## 2024-10-09 PROCEDURE — 3044F HG A1C LEVEL LT 7.0%: CPT | Performed by: FAMILY MEDICINE

## 2024-10-09 PROCEDURE — 1158F ADVNC CARE PLAN TLK DOCD: CPT | Performed by: FAMILY MEDICINE

## 2024-10-09 PROCEDURE — 3062F POS MACROALBUMINURIA REV: CPT | Performed by: FAMILY MEDICINE

## 2024-10-09 PROCEDURE — 1157F ADVNC CARE PLAN IN RCRD: CPT | Performed by: FAMILY MEDICINE

## 2024-10-09 PROCEDURE — 1160F RVW MEDS BY RX/DR IN RCRD: CPT | Performed by: FAMILY MEDICINE

## 2024-10-09 PROCEDURE — 99214 OFFICE O/P EST MOD 30 MIN: CPT | Performed by: FAMILY MEDICINE

## 2024-10-09 PROCEDURE — 3078F DIAST BP <80 MM HG: CPT | Performed by: FAMILY MEDICINE

## 2024-10-09 PROCEDURE — 3048F LDL-C <100 MG/DL: CPT | Performed by: FAMILY MEDICINE

## 2024-10-09 PROCEDURE — 1123F ACP DISCUSS/DSCN MKR DOCD: CPT | Performed by: FAMILY MEDICINE

## 2024-10-09 PROCEDURE — 3074F SYST BP LT 130 MM HG: CPT | Performed by: FAMILY MEDICINE

## 2024-10-09 RX ORDER — SEMAGLUTIDE 0.68 MG/ML
0.5 INJECTION, SOLUTION SUBCUTANEOUS
Qty: 9 ML | Refills: 0 | Status: CANCELLED | OUTPATIENT
Start: 2024-10-13

## 2024-10-09 ASSESSMENT — ENCOUNTER SYMPTOMS
PALPITATIONS: 0
WEIGHT LOSS: 1
FEVER: 0
BLURRED VISION: 0
HEADACHES: 0
HEMATURIA: 0
DIZZINESS: 0
WHEEZING: 0
SHORTNESS OF BREATH: 0
VOMITING: 0
VISUAL CHANGE: 0
FREQUENCY: 0
NUMBNESS: 0
POLYPHAGIA: 0
SORE THROAT: 0
FATIGUE: 0
RHINORRHEA: 0
CHILLS: 0
DYSURIA: 0
DIARRHEA: 0
CONSTIPATION: 0
COUGH: 0
POLYDIPSIA: 0
ABDOMINAL PAIN: 0
WEAKNESS: 0
TREMORS: 0

## 2024-10-09 NOTE — PATIENT INSTRUCTIONS
BMI was above normal measurement. Current weight: 129 kg (285 lb)  Weight change since last visit (-) denotes wt loss -15.6 lbs   Weight loss needed to achieve BMI 25: 101.1 Lbs  Weight loss needed to achieve BMI 30: 64.3 Lbs  Advised to Increase physical activity.

## 2024-10-09 NOTE — PROGRESS NOTES
Subjective   Patient ID: Simba Washington is a 64 y.o. male who presents for Annual Exam and Follow-up (Diabetes, Hypertension, Hyperlipidemia and labs).    Patient presents today for annual physical exam and follow-up on hypertension and hyperlipidemia. He has no chest pain, dyspnea, exertional chest pressure/discomfort, near-syncope, orthopnea, palpitations, paroxysmal nocturnal dyspnea, and syncope. Taking his medication regularly with no side effects.    Diabetes  He presents for his follow-up diabetic visit. He has type 2 diabetes mellitus. His disease course has been improving. There are no hypoglycemic associated symptoms. Pertinent negatives for hypoglycemia include no dizziness, headaches or tremors. Associated symptoms include weight loss. Pertinent negatives for diabetes include no blurred vision, no chest pain, no fatigue, no foot paresthesias, no foot ulcerations, no polydipsia, no polyphagia, no polyuria, no visual change and no weakness. Risk factors for coronary artery disease include hypertension, diabetes mellitus, male sex, obesity and dyslipidemia. Eye exam is current.      Review of Systems   Constitutional:  Positive for weight loss. Negative for chills, fatigue and fever.   HENT:  Negative for congestion, ear pain, nosebleeds, rhinorrhea and sore throat.    Eyes:  Negative for blurred vision.   Respiratory:  Negative for cough, shortness of breath and wheezing.    Cardiovascular:  Negative for chest pain, palpitations and leg swelling.   Gastrointestinal:  Negative for abdominal pain, constipation, diarrhea and vomiting.   Endocrine: Negative for polydipsia, polyphagia and polyuria.   Genitourinary:  Negative for dysuria, frequency and hematuria.   Neurological:  Negative for dizziness, tremors, weakness, numbness and headaches.       Objective   /68   Pulse 81   Temp 36.4 °C (97.6 °F)   Resp 18   Ht 1.829 m (6')   Wt 129 kg (285 lb)   SpO2 95%   BMI 38.65 kg/m²     Physical  Exam  Constitutional:       General: He is not in acute distress.     Appearance: Normal appearance.   HENT:      Head: Normocephalic and atraumatic.      Mouth/Throat:      Mouth: Mucous membranes are moist.      Pharynx: Oropharynx is clear. No oropharyngeal exudate or posterior oropharyngeal erythema.   Eyes:      General: No scleral icterus.     Extraocular Movements: Extraocular movements intact.      Pupils: Pupils are equal, round, and reactive to light.   Cardiovascular:      Rate and Rhythm: Normal rate and regular rhythm.      Pulses: Normal pulses.      Heart sounds: No murmur heard.     No friction rub. No gallop.   Pulmonary:      Effort: Pulmonary effort is normal.      Breath sounds: No wheezing, rhonchi or rales.   Skin:     General: Skin is warm.      Coloration: Skin is not jaundiced or pale.      Findings: No erythema or rash.   Neurological:      General: No focal deficit present.      Mental Status: He is alert and oriented to person, place, and time.      Cranial Nerves: No cranial nerve deficit.      Sensory: No sensory deficit.      Coordination: Coordination normal.      Gait: Gait normal.         Lab on 09/16/2024   Component Date Value Ref Range Status    Glucose 09/16/2024 109 (H)  74 - 99 mg/dL Final    Sodium 09/16/2024 139  136 - 145 mmol/L Final    Potassium 09/16/2024 4.2  3.5 - 5.3 mmol/L Final    Chloride 09/16/2024 100  98 - 107 mmol/L Final    Bicarbonate 09/16/2024 33 (H)  21 - 32 mmol/L Final    Anion Gap 09/16/2024 10  10 - 20 mmol/L Final    Urea Nitrogen 09/16/2024 12  6 - 23 mg/dL Final    Creatinine 09/16/2024 0.87  0.50 - 1.30 mg/dL Final    eGFR 09/16/2024 >90  >60 mL/min/1.73m*2 Final    Calculations of estimated GFR are performed using the 2021 CKD-EPI Study Refit equation without the race variable for the IDMS-Traceable creatinine methods.  https://jasn.asnjournals.org/content/early/2021/09/22/ASN.1822439370    Calcium 09/16/2024 9.0  8.6 - 10.3 mg/dL Final    Albumin  09/16/2024 4.1  3.4 - 5.0 g/dL Final    Alkaline Phosphatase 09/16/2024 88  33 - 136 U/L Final    Total Protein 09/16/2024 7.1  6.4 - 8.2 g/dL Final    AST 09/16/2024 14  9 - 39 U/L Final    Bilirubin, Total 09/16/2024 0.7  0.0 - 1.2 mg/dL Final    ALT 09/16/2024 12  10 - 52 U/L Final    Patients treated with Sulfasalazine may generate falsely decreased results for ALT.    Hemoglobin A1C 09/16/2024 5.8 (H)  see below % Final    Estimated Average Glucose 09/16/2024 120  Not Established mg/dL Final    Cholesterol 09/16/2024 154  0 - 199 mg/dL Final          Age      Desirable   Borderline High   High     0-19 Y     0 - 169       170 - 199     >/= 200    20-24 Y     0 - 189       190 - 224     >/= 225         >24 Y     0 - 199       200 - 239     >/= 240   **All ranges are based on fasting samples. Specific   therapeutic targets will vary based on patient-specific   cardiac risk.    Pediatric guidelines reference:Pediatrics 2011, 128(S5).Adult guidelines reference: NCEP ATPIII Guidelines,BETTY 2001, 258:2486-97    Venipuncture immediately after or during the administration of Metamizole may lead to falsely low results. Testing should be performed immediately prior to Metamizole dosing.    HDL-Cholesterol 09/16/2024 37.9  mg/dL Final      Age       Very Low   Low     Normal    High    0-19 Y    < 35      < 40     40-45     ----  20-24 Y    ----     < 40      >45      ----        >24 Y      ----     < 40     40-60      >60      Cholesterol/HDL Ratio 09/16/2024 4.1   Final      Ref Values  Desirable  < 3.4  High Risk  > 5.0    LDL Calculated 09/16/2024 87  <=99 mg/dL Final                                Near   Borderline      AGE      Desirable  Optimal    High     High     Very High     0-19 Y     0 - 109     ---    110-129   >/= 130     ----    20-24 Y     0 - 119     ---    120-159   >/= 160     ----      >24 Y     0 -  99   100-129  130-159   160-189     >/=190      VLDL 09/16/2024 29  0 - 40 mg/dL Final     Triglycerides 09/16/2024 145  0 - 149 mg/dL Final       Age         Desirable   Borderline High   High     Very High   0 D-90 D    19 - 174         ----         ----        ----  91 D- 9 Y     0 -  74        75 -  99     >/= 100      ----    10-19 Y     0 -  89        90 - 129     >/= 130      ----    20-24 Y     0 - 114       115 - 149     >/= 150      ----         >24 Y     0 - 149       150 - 199    200- 499    >/= 500    Venipuncture immediately after or during the administration of Metamizole may lead to falsely low results. Testing should be performed immediately prior to Metamizole dosing.    Non HDL Cholesterol 09/16/2024 116  0 - 149 mg/dL Final          Age       Desirable   Borderline High   High     Very High     0-19 Y     0 - 119       120 - 144     >/= 145    >/= 160    20-24 Y     0 - 149       150 - 189     >/= 190      ----         >24 Y    30 mg/dL above LDL Cholesterol goal      Albumin, Urine Random 09/16/2024 <7.0  Not established mg/L Final    Creatinine, Urine Random 09/16/2024 13.7 (L)  20.0 - 370.0 mg/dL Final    Albumin/Creatinine Ratio 09/16/2024    Final    One or more analytes used in this calculation is outside of the analytical measurement range. Calculation cannot be performed.     Assessment/Plan   Problem List Items Addressed This Visit       Atherosclerotic heart disease of native coronary artery without angina pectoris     Stable based on symptoms and exam.  Continue established treatment plan and follow-up at least yearly.          COPD (chronic obstructive pulmonary disease) (Multi)     Stable based on symptoms and exam.  Continue established treatment plan and follow-up at least yearly.          Essential hypertension     Well-controlled, continue current medications and management.         Relevant Orders    Comprehensive Metabolic Panel    Hemoglobin A1C    Lipid Panel    Follow Up In [facility] - PCP - Established    Mixed hyperlipidemia     The nature of cardiac  risk has been fully discussed with this patient. Discussed cardiovascular risk analysis and appropriate diet with the need for lifelong measures to reduce the risk. A regular exercise program is recommended to help achieve and maintain normal body weight, fitness and improve lipid balance. Patient education provided. They understand and agree with this course of treatment. They will return with new or worsening symptoms. Patient instructed to remain current with appropriate annual health maintenance.          Relevant Orders    Comprehensive Metabolic Panel    Hemoglobin A1C    Lipid Panel    Follow Up In [facility] - PCP - Established    Class 2 severe obesity due to excess calories with serious comorbidity and body mass index (BMI) of 38.0 to 38.9 in adult     Continue decrease calorie diet and not more than 1500 calorie per day diet and low-fat diet.  Continue with regular exercise program.  We advised exercise at least 5 days a week for at least 45 minutes and also a minimum of 10,000 steps a day.  The detrimental effects of obesity on health were discussed.         Type 2 diabetes mellitus without complication, without long-term current use of insulin (Multi)     Diabetes Mellitus type II, under good control.  1. Rx changes: Increase Ozempic to 1 mg weekly.  2. Education: Reviewed ‘ABCs’ of diabetes management (respective goals in parentheses):  A1C (<7), blood pressure (<130/80), and cholesterol (LDL <100).  3. Compliance at present is estimated to be good. Efforts to improve compliance (if necessary) will be directed at dietary modifications: and increased exercise.  4. Follow up: 5 months         Relevant Medications    semaglutide (OZEMPIC) 1 mg/dose (4 mg/3 mL) pen injector    Other Relevant Orders    Comprehensive Metabolic Panel    Hemoglobin A1C    Lipid Panel    Follow Up In [facility] - PCP - Established    Annual physical exam - Primary     Recommend low-cholesterol diet,  low-fat diet and low-salt diet.  The need for lifelong dietary compliance in order to reduce cardiac risk is recommended.  We will also recommend regular exercise program to improve lipid balance and overall health.  Recommend decreasing fat and cholesterol in diet, increasing aerobic exercise with a goal of 4 or more days per week          Scribe Attestation  By signing my name below, IHarvey Scribe   attest that this documentation has been prepared under the direction and in the presence of Usama Giang MD.

## 2024-10-09 NOTE — ASSESSMENT & PLAN NOTE
Diabetes Mellitus type II, under good control.  1. Rx changes: Increase Ozempic to 1 mg weekly.  2. Education: Reviewed ‘ABCs’ of diabetes management (respective goals in parentheses):  A1C (<7), blood pressure (<130/80), and cholesterol (LDL <100).  3. Compliance at present is estimated to be good. Efforts to improve compliance (if necessary) will be directed at dietary modifications: and increased exercise.  4. Follow up: 5 months

## 2024-10-11 ENCOUNTER — CLINICAL SUPPORT (OUTPATIENT)
Dept: SLEEP MEDICINE | Facility: CLINIC | Age: 64
End: 2024-10-11
Payer: MEDICARE

## 2024-10-11 DIAGNOSIS — G47.33 OSA (OBSTRUCTIVE SLEEP APNEA): ICD-10-CM

## 2024-10-12 NOTE — PROGRESS NOTES
[facility] TECH NOTE:     Patient: Simba Mendiola   MRN//AGE: [id number]  1960  64 y.o.   Technologist: TAE PURCELL   Room: 1   Service Date: 10/12/2024        Sleep Testing Location: [place]: 1    TECHNOLOGIST SLEEP STUDY PROCEDURE NOTE:   This sleep study is being conducted according to the policies and procedures outlined by the AAS accreditation standards.  The sleep study procedure and processes involved during this appointment was explained to the patient/patient’s family, questions were answered. The patient/family verbalized understanding.      The patient is a 64 y.o. year old male scheduled for a Diagnostic PSG Split night with montage of:  PSG . he arrived for his appointment.      The study that was ultimately completed was a Diagnostic PSG Split night with montage of:  PSG .    The full study Was completed.  Patient questionnaires completed?: yes     Consents signed? yes    Initial Fall Risk Screening:     Simba has not fallen in the last 6 months. his did not result in injury. Simba does not have a fear of falling. He does not need assistance with sitting, standing, or walking. he does not need assistance walking in his home. he does not need assistance in an unfamiliar setting. The patient is notusing an assistive device.     Brief Study observations:   SIMBA MENDIOLA  10/11/2024  MRN [id number]  Patient is a 63 y/o Male with comorbidities of Anemia, Antral ulcer, Atherosclerotic heart disease, BPPV (benign paroxysmal positional vertigo), CAD S/P percutaneous coronary, pain of left knee, Claudication, Dizziness, Vertigo, Edema, Effort angina, Essential hypertension, Hypokalemia, Meniere disease, Mixed hyperlipidemia, PAD (peripheral artery disease), osteoarthritis, Urinary frequency, Vitamin D deficiency, severe obesity, Chondrocalcinosis of knee, Hyperglycemia, Chronic fatigue, diabetes mellitus, on CPAP  Pt interview: Patient arrived to the Sleep lab at 7:00PM. For the patient's sleep  symptoms and full medical history, see encounters.  CPAP / the split process was explained to the patient prior to Lights out, and he verbalized understanding.   Study: Using 4% scoring rules, pt meet split criteria and CPAP was applied at 0114 am, after >2 hours of sleep.   Observations:  The hookup was done in the room, and the purpose of all sensors was explained and all questions were answered. The importance of some supine sleep with minimal pillow elevation was emphasized, but the tech assured the pt that side sleep was acceptable as well. Biocals were completed. Normal sleep onset was observed. Once asleep, frequent loud snoring was heard. Respiratory events were seen, CPAP was started at 4 cmH2O. The patient was having respiratory events so pressure was increased to 12 cmH2O, then O2 @ 1LPM was added due to the persistance of hypoxemia. F&P Vitera mask size L was used, no heated humidity, no C-flex.    The patient was observed sleeping supine. All sleeps stages were seen.  Baseline SaO2 was 89%, and SaO2 low was 64%.  Meds: Aspirin, Atorvastatin, Carvedilol, Dulera, Flonase, Furosemide, Nitrostat, Potassium Chloride, Ozempic, Tiotropium.      Deviation to order/protocol and reason: NA      If PAP, which was preferred mask/pressure/mode: F&P Vitera Large      Other:None    After the procedure, the patient/family was informed to ensure followup with ordering clinician for testing results.      Technologist: TAE PURCELL

## 2024-11-04 DIAGNOSIS — G47.33 OSA (OBSTRUCTIVE SLEEP APNEA): Primary | ICD-10-CM

## 2024-11-06 ENCOUNTER — TELEPHONE (OUTPATIENT)
Dept: PULMONOLOGY | Facility: CLINIC | Age: 64
End: 2024-11-06
Payer: MEDICARE

## 2024-11-06 NOTE — TELEPHONE ENCOUNTER
----- Message from Shawna Villar sent at 11/4/2024  1:43 PM EST -----  Simba just had a sleep study done. I'm sending him a BiPAP machine. Please schedule him to follow up with us in 3 months to check on compliance data.

## 2024-11-11 DIAGNOSIS — E87.6 HYPOKALEMIA: ICD-10-CM

## 2024-11-12 RX ORDER — POTASSIUM CHLORIDE 750 MG/1
10 TABLET, EXTENDED RELEASE ORAL DAILY
Qty: 90 TABLET | Refills: 2 | Status: SHIPPED | OUTPATIENT
Start: 2024-11-12

## 2024-12-30 DIAGNOSIS — E11.9 NEW ONSET TYPE 2 DIABETES MELLITUS (MULTI): ICD-10-CM

## 2024-12-30 RX ORDER — LANCETS 30 GAUGE
EACH MISCELLANEOUS
Qty: 1 EACH | Refills: 0 | Status: SHIPPED | OUTPATIENT
Start: 2024-12-30

## 2025-01-11 ENCOUNTER — TELEPHONE (OUTPATIENT)
Dept: PRIMARY CARE | Facility: CLINIC | Age: 65
End: 2025-01-11
Payer: MEDICARE

## 2025-02-12 ENCOUNTER — APPOINTMENT (OUTPATIENT)
Dept: PULMONOLOGY | Facility: CLINIC | Age: 65
End: 2025-02-12
Payer: MEDICARE

## 2025-02-12 VITALS
DIASTOLIC BLOOD PRESSURE: 72 MMHG | OXYGEN SATURATION: 93 % | TEMPERATURE: 97.5 F | WEIGHT: 280.2 LBS | HEIGHT: 72 IN | BODY MASS INDEX: 37.95 KG/M2 | HEART RATE: 91 BPM | SYSTOLIC BLOOD PRESSURE: 121 MMHG

## 2025-02-12 DIAGNOSIS — G47.33 OSA TREATED WITH BIPAP: Primary | ICD-10-CM

## 2025-02-12 PROCEDURE — 3074F SYST BP LT 130 MM HG: CPT | Performed by: INTERNAL MEDICINE

## 2025-02-12 PROCEDURE — 3008F BODY MASS INDEX DOCD: CPT | Performed by: INTERNAL MEDICINE

## 2025-02-12 PROCEDURE — 1157F ADVNC CARE PLAN IN RCRD: CPT | Performed by: INTERNAL MEDICINE

## 2025-02-12 PROCEDURE — 99214 OFFICE O/P EST MOD 30 MIN: CPT | Performed by: INTERNAL MEDICINE

## 2025-02-12 PROCEDURE — 1159F MED LIST DOCD IN RCRD: CPT | Performed by: INTERNAL MEDICINE

## 2025-02-12 PROCEDURE — 1123F ACP DISCUSS/DSCN MKR DOCD: CPT | Performed by: INTERNAL MEDICINE

## 2025-02-12 PROCEDURE — 3078F DIAST BP <80 MM HG: CPT | Performed by: INTERNAL MEDICINE

## 2025-02-12 ASSESSMENT — ENCOUNTER SYMPTOMS
LOSS OF SENSATION IN FEET: 0
DEPRESSION: 0
OCCASIONAL FEELINGS OF UNSTEADINESS: 0

## 2025-02-14 NOTE — PROGRESS NOTES
Department of Medicine  Division of Pulmonary, Critical Care, and Sleep Medicine  Follow-Up Visit  [facility] - [address], [address]    Physician HPI:  Mr Washington is a 64-year-old male with past medical history significant for heavy tobacco use (quit 3 years ago), obesity, diastolic heart failure, COPD GOLD III and RYAN who presented to the office today for follow up.     Simba was last seen in December 2018. He reports overall stable respiratory status over the past year. No interval hx of COPD exacerbation or hospitalizations. He was tested positive for COVID19 in April. His symptoms were mild and he was isolated at home until recovery. He is using Dulera and Spiriva for COPD. He is compliant with BiPAP every night and reports to feel better with PAP therapy. Denies excessive daytime fatigue or sleepiness. He has chronic dyspnea on exertion. He is able to conduct his daily activities. No purulent sputum production now, orthopnea or LE edema.      Follow-up 11/22/2022:  No acute respiratory symptoms today no or interval history of acute COPD exacerbation or hospital admission. Using ICS/LABA as instruction. On noninvasive ventilation at nighttime.     Follow up 6/15/2023:  Respiratory status has been stable since last visit. No interval hx of COPD exacerbation. Compliant with NIV at night time.     Follow up 12/04/2023:  Simba reports stable respiratory status since last visit. No interval hx of acute COPD exacerbation.     Follow up 06/04/2024:  No acute symptoms today. Retired now. No interval hx of acute COPD exacerbation. Using NIV at night.     Follow up 02/12/2025:  Simba reports to feel better with BIPAP. He has an excellent compliance. No acute respiratory symptoms today.     Immunization History   Administered Date(s) Administered    Flu vaccine (IIV4), preservative free *Check age/dose* 10/13/2014, 11/02/2020    Flu vaccine, trivalent, preservative free, age 6 months and greater  (Fluarix/Fluzone/Flulaval) 09/23/2011    Influenza, Unspecified 10/04/2003    Influenza, injectable, quadrivalent 10/05/2018    Influenza, seasonal, injectable 09/28/2012    Pneumococcal polysaccharide vaccine, 23-valent, age 2 years and older (PNEUMOVAX 23) 10/05/2018       Current Outpatient Medications   Medication Instructions    aspirin-calcium carbonate 81 mg-300 mg calcium(777 mg) tablet 1 tablet, Daily    atorvastatin (LIPITOR) 80 mg, oral, Daily    carvedilol (COREG) 6.25 mg, oral, 2 times daily (morning and late afternoon)    Dulera 100-5 mcg/actuation inhaler 2 puffs, inhalation, 2 times daily    fluticasone (Flonase) 50 mcg/actuation nasal spray 2 sprays, Each Nostril, Daily    furosemide (LASIX) 40 mg, oral, Daily    mometasone-formoterol (Dulera 200) 200-5 mcg/actuation inhaler 2 puffs, inhalation, 2 times daily RT    nitroglycerin (Nitrostat) 0.4 mg SL tablet Place under the tongue.    OneTouch Delica Plus Lancet 33 gauge misc USE THREE TIMES A DAY TO TEST BLOOD SUGAR    OneTouch Ultra Test strip Use to test blood sugar 3 times a day    OneTouch Ultra2 Meter misc USE TO TEST BLOOD SUGAR 3 TIMES  DAILY    potassium chloride CR 10 mEq ER tablet 10 mEq, oral, Daily, Take with food.    semaglutide (OZEMPIC) 1 mg, subcutaneous, Weekly    tiotropium (Spiriva Respimat) 1.25 mcg/actuation inhaler 2 puffs, inhalation, Daily        Allergies:  No Known Allergies       Visit Vitals  /72 (BP Location: Left arm, Patient Position: Sitting, BP Cuff Size: Large adult)   Pulse 91   Temp 36.4 °C (97.5 °F) (Temporal)   Ht 1.829 m (6')   Wt 127 kg (280 lb 3.2 oz)   SpO2 93%   BMI 38.00 kg/m²   Smoking Status Former   BSA 2.54 m²        Physical Exam     Constitutional: General appearance: Abnormal.  appears healthy,~overweight~and~well hydrated.   Ears, Nose, Mouth, and Throat: External inspection of ears and nose: Normal.    Pulmonary: Chest: Normal to inspection. ~Respiratory effort: No increased work of  breathing or signs of respiratory distress.   Psychiatric: Judgment and insight: Intact.~Alert and oriented to person, place and time.~Mood and affect: Normal.        Chest Radiograph     XR chest 1 view 07/19/2022    Narrative  MRN: [id number]  Patient Name: MARTHA MENDIOLA    STUDY:  CHEST 1 VIEW;  7/19/2022 5:20 am    INDICATION:  Chest Pain .    COMPARISON:  Chest radiograph 08/28/2019    ACCESSION NUMBER(S):  [id number]    ORDERING CLINICIAN:  TERESA GEORGE    FINDINGS:      CARDIOMEDIASTINAL SILHOUETTE:  Cardiomediastinal silhouette is stable in size and configuration.    LUNGS:  Curvilinear opacity overlying the left lung base. No pleural effusion  or pneumothorax.    ABDOMEN:  No remarkable upper abdominal findings.    BONES:  No acute osseous abnormality.    Impression  Subsegmental atelectasis versus scarring in the left lung base.      XR CHEST 1 VIEW 08/28/2019    Narrative  MRN: [id number]  Patient Name: MARTHA MENDIOLA    STUDY:  CHEST 1 VIEW; 8/28/2019 10:06 am    INDICATION:  Chest Pain.    COMPARISON:  07/30/2018    ACCESSION NUMBER(S):  [id number]    ORDERING CLINICIAN:  DANDRE LEE    TECHNIQUE:  Portable upright frontal view of the chest    FINDINGS:  Cardiomediastinal silhouette is stable. There is mild right lower  lobe peribronchial cuffing which could be caused by an infectious or  inflammatory process. No consolidation, pleural effusion or  pneumothorax.    Impression  As above    Laboratory Studies     Lab Results   Component Value Date    WBC 8.3 05/31/2024    HGB 16.0 05/31/2024    HCT 51.8 05/31/2024    MCV 92 05/31/2024     05/31/2024      Lab Results   Component Value Date    GLUCOSE 109 (H) 09/16/2024    CALCIUM 9.0 09/16/2024     09/16/2024    K 4.2 09/16/2024    CO2 33 (H) 09/16/2024     09/16/2024    BUN 12 09/16/2024    CREATININE 0.87 09/16/2024      Lab Results   Component Value Date    ALT 12 09/16/2024    AST 14 09/16/2024    ALKPHOS 88 09/16/2024    BILITOT 0.7 09/16/2024         Protime   Date/Time Value Ref Range Status   07/19/2022 05:17 AM 12.0 9.8 - 13.4 sec Final     INR   Date/Time Value Ref Range Status   07/19/2022 05:17 AM 1.0 0.9 - 1.1 Final       Assessment and Plan / Recommendations     1. COPD (moderate), MMRC II  2. Chronic hypoxic and hypercapnic respiratory failure  3. Obstructive sleep apnea   4. HFpEF  5. Overweight   6. Lung cancer screening     Plan:  1. Continue on current inhaler regimen  2. Continue on current BiPAP settings   3. Up to date with influenza and Pneumococcal vaccinations  4. LDCT for lung cancer screening is recommended but declined by the patient.         Follow-up in 12 months or sooner if necessary.        Please excuse any misspellings or unintended errors related to the Dragon speech recognition software used to dictate this note.         Shawna Villar MD   02/12/2025

## 2025-02-16 DIAGNOSIS — E11.9 TYPE 2 DIABETES MELLITUS WITHOUT COMPLICATION, WITHOUT LONG-TERM CURRENT USE OF INSULIN (MULTI): ICD-10-CM

## 2025-02-16 RX ORDER — SEMAGLUTIDE 1.34 MG/ML
1 INJECTION, SOLUTION SUBCUTANEOUS
Qty: 9 ML | Refills: 0 | Status: SHIPPED | OUTPATIENT
Start: 2025-02-16

## 2025-03-11 ENCOUNTER — APPOINTMENT (OUTPATIENT)
Dept: PRIMARY CARE | Facility: CLINIC | Age: 65
End: 2025-03-11
Payer: MEDICARE

## 2025-03-11 VITALS
HEIGHT: 72 IN | TEMPERATURE: 97.8 F | WEIGHT: 279 LBS | SYSTOLIC BLOOD PRESSURE: 124 MMHG | HEART RATE: 83 BPM | DIASTOLIC BLOOD PRESSURE: 84 MMHG | RESPIRATION RATE: 17 BRPM | OXYGEN SATURATION: 95 % | BODY MASS INDEX: 37.79 KG/M2

## 2025-03-11 DIAGNOSIS — I73.9 PAD (PERIPHERAL ARTERY DISEASE) (CMS-HCC): ICD-10-CM

## 2025-03-11 DIAGNOSIS — E78.2 MIXED HYPERLIPIDEMIA: ICD-10-CM

## 2025-03-11 DIAGNOSIS — I10 ESSENTIAL HYPERTENSION: ICD-10-CM

## 2025-03-11 DIAGNOSIS — E11.9 TYPE 2 DIABETES MELLITUS WITHOUT COMPLICATION, WITHOUT LONG-TERM CURRENT USE OF INSULIN (MULTI): ICD-10-CM

## 2025-03-11 DIAGNOSIS — E66.812 CLASS 2 SEVERE OBESITY DUE TO EXCESS CALORIES WITH SERIOUS COMORBIDITY AND BODY MASS INDEX (BMI) OF 37.0 TO 37.9 IN ADULT: ICD-10-CM

## 2025-03-11 DIAGNOSIS — R60.9 EDEMA, UNSPECIFIED TYPE: ICD-10-CM

## 2025-03-11 DIAGNOSIS — I20.89 EFFORT ANGINA (CMS-HCC): ICD-10-CM

## 2025-03-11 DIAGNOSIS — E66.01 CLASS 2 SEVERE OBESITY DUE TO EXCESS CALORIES WITH SERIOUS COMORBIDITY AND BODY MASS INDEX (BMI) OF 37.0 TO 37.9 IN ADULT: ICD-10-CM

## 2025-03-11 DIAGNOSIS — Z00.00 ROUTINE GENERAL MEDICAL EXAMINATION AT A HEALTH CARE FACILITY: Primary | ICD-10-CM

## 2025-03-11 DIAGNOSIS — J41.8 MIXED SIMPLE AND MUCOPURULENT CHRONIC BRONCHITIS (MULTI): ICD-10-CM

## 2025-03-11 PROBLEM — R05.9 COUGH IN ADULT: Status: RESOLVED | Noted: 2023-03-07 | Resolved: 2025-03-11

## 2025-03-11 PROCEDURE — 1123F ACP DISCUSS/DSCN MKR DOCD: CPT | Performed by: FAMILY MEDICINE

## 2025-03-11 PROCEDURE — 1157F ADVNC CARE PLAN IN RCRD: CPT | Performed by: FAMILY MEDICINE

## 2025-03-11 PROCEDURE — 1170F FXNL STATUS ASSESSED: CPT | Performed by: FAMILY MEDICINE

## 2025-03-11 PROCEDURE — G0439 PPPS, SUBSEQ VISIT: HCPCS | Performed by: FAMILY MEDICINE

## 2025-03-11 PROCEDURE — 99214 OFFICE O/P EST MOD 30 MIN: CPT | Performed by: FAMILY MEDICINE

## 2025-03-11 PROCEDURE — 1159F MED LIST DOCD IN RCRD: CPT | Performed by: FAMILY MEDICINE

## 2025-03-11 PROCEDURE — 3008F BODY MASS INDEX DOCD: CPT | Performed by: FAMILY MEDICINE

## 2025-03-11 PROCEDURE — 3074F SYST BP LT 130 MM HG: CPT | Performed by: FAMILY MEDICINE

## 2025-03-11 PROCEDURE — 1160F RVW MEDS BY RX/DR IN RCRD: CPT | Performed by: FAMILY MEDICINE

## 2025-03-11 PROCEDURE — 1036F TOBACCO NON-USER: CPT | Performed by: FAMILY MEDICINE

## 2025-03-11 PROCEDURE — 3079F DIAST BP 80-89 MM HG: CPT | Performed by: FAMILY MEDICINE

## 2025-03-11 RX ORDER — FUROSEMIDE 40 MG/1
40 TABLET ORAL DAILY
Qty: 90 TABLET | Refills: 2 | Status: SHIPPED | OUTPATIENT
Start: 2025-03-11

## 2025-03-11 ASSESSMENT — ENCOUNTER SYMPTOMS
FATIGUE: 0
POLYPHAGIA: 0
PALPITATIONS: 0
DIZZINESS: 0
SORE THROAT: 0
DYSURIA: 0
NUMBNESS: 0
SHORTNESS OF BREATH: 0
WHEEZING: 0
CHILLS: 0
FEVER: 0
VOMITING: 0
ABDOMINAL PAIN: 0
FREQUENCY: 0
HEMATURIA: 0
RHINORRHEA: 0
COUGH: 0
WEAKNESS: 0
BLURRED VISION: 0
DIARRHEA: 0
TREMORS: 0
HEADACHES: 0
VISUAL CHANGE: 0
POLYDIPSIA: 0
CONSTIPATION: 0

## 2025-03-11 ASSESSMENT — ACTIVITIES OF DAILY LIVING (ADL)
DOING_HOUSEWORK: INDEPENDENT
GROCERY_SHOPPING: INDEPENDENT
TAKING_MEDICATION: INDEPENDENT
DRESSING: INDEPENDENT
MANAGING_FINANCES: INDEPENDENT
BATHING: INDEPENDENT

## 2025-03-11 ASSESSMENT — PATIENT HEALTH QUESTIONNAIRE - PHQ9
SUM OF ALL RESPONSES TO PHQ9 QUESTIONS 1 AND 2: 0
1. LITTLE INTEREST OR PLEASURE IN DOING THINGS: NOT AT ALL
2. FEELING DOWN, DEPRESSED OR HOPELESS: NOT AT ALL

## 2025-03-11 NOTE — PROGRESS NOTES
Subjective   Patient ID: Simba Washington is a 64 y.o. male who presents for Medicare Annual Wellness Visit Subsequent and Follow-up (Diabetes, Hypertension, and Hyperlipidemia).    He presents for Annual Medicare Wellness Visit and follow-up on hypertension and hyperlipidemia. He has no chest pain, dyspnea, exertional chest pressure/discomfort, near-syncope, orthopnea, palpitations, paroxysmal nocturnal dyspnea, and syncope. Taking his medication regularly with no side effects.    Diabetes  He presents for his follow-up diabetic visit. He has type 2 diabetes mellitus. His disease course has been stable. There are no hypoglycemic associated symptoms. Pertinent negatives for hypoglycemia include no dizziness, headaches or tremors. Pertinent negatives for diabetes include no blurred vision, no chest pain, no fatigue, no foot paresthesias, no foot ulcerations, no polydipsia, no polyphagia, no polyuria, no visual change and no weakness. Risk factors for coronary artery disease include diabetes mellitus, dyslipidemia, hypertension, male sex and obesity.      Past Medical, Surgical, and Family History reviewed and updated in chart.    Reviewed all medications by prescribing practitioner or clinical pharmacist (such as prescriptions, OTCs, herbal therapies and supplements) and documented in the medical record.    Patient Self Assessment of Health Status  Patient Self Assessment: Good    Nutrition and Exercise  Current Diet: Well Balanced Diet  Adequate Fluid Intake: Yes  Caffeine: No  Exercise Frequency: Regularly    Functional Ability/Level of Safety  Cognitive Impairment Observed: No cognitive impairment observed    Home Safety Risk Factors: None    Review of Systems   Constitutional:  Negative for chills, fatigue and fever.   HENT:  Negative for congestion, ear pain, nosebleeds, rhinorrhea and sore throat.    Eyes:  Negative for blurred vision.   Respiratory:  Negative for cough, shortness of breath and wheezing.     Cardiovascular:  Negative for chest pain, palpitations and leg swelling.   Gastrointestinal:  Negative for abdominal pain, constipation, diarrhea and vomiting.   Endocrine: Negative for cold intolerance, heat intolerance, polydipsia, polyphagia and polyuria.   Genitourinary:  Negative for dysuria, frequency and hematuria.   Neurological:  Negative for dizziness, tremors, weakness, numbness and headaches.       Objective   /84   Pulse 83   Temp 36.6 °C (97.8 °F)   Resp 17   Ht 1.829 m (6')   Wt 127 kg (279 lb)   SpO2 95%   BMI 37.84 kg/m²     Physical Exam  Constitutional:       General: He is not in acute distress.     Appearance: Normal appearance.   HENT:      Head: Normocephalic and atraumatic.      Mouth/Throat:      Mouth: Mucous membranes are moist.      Pharynx: Oropharynx is clear. No oropharyngeal exudate or posterior oropharyngeal erythema.   Eyes:      General: No scleral icterus.     Extraocular Movements: Extraocular movements intact.      Pupils: Pupils are equal, round, and reactive to light.   Cardiovascular:      Rate and Rhythm: Normal rate and regular rhythm.      Pulses: Normal pulses.      Heart sounds: No murmur heard.     No friction rub. No gallop.   Pulmonary:      Effort: Pulmonary effort is normal.      Breath sounds: No wheezing, rhonchi or rales.   Skin:     General: Skin is warm.      Coloration: Skin is not jaundiced or pale.      Findings: No erythema or rash.   Neurological:      General: No focal deficit present.      Mental Status: He is alert and oriented to person, place, and time.      Cranial Nerves: No cranial nerve deficit.      Sensory: No sensory deficit.      Coordination: Coordination normal.      Gait: Gait normal.         Assessment/Plan   Problem List Items Addressed This Visit       COPD (chronic obstructive pulmonary disease) (Multi)     Chronic Condition Documentation : Stable based on symptoms and exam.  Continue established treatment plan and  follow-up at least yearly.         Edema     Recommendations: decrease sodium in the diet, elevate feet above the level of the heart whenever possible, increase physical activity, use of compression stockings, and weight loss.         Effort angina (CMS-HCC)     Chronic Condition Documentation : Stable based on symptoms and exam.  Continue established treatment plan and follow-up at least yearly.         Essential hypertension     Well-controlled, continue current medications and management.         Relevant Orders    Comprehensive Metabolic Panel    Lipid Panel    Hemoglobin A1C    Albumin-Creatinine Ratio, Urine Random    Follow Up In [facility] - PCP - Established    Mixed hyperlipidemia     The nature of cardiac risk has been fully discussed with this patient. Discussed cardiovascular risk analysis and appropriate diet with the need for lifelong measures to reduce the risk. A regular exercise program is recommended to help achieve and maintain normal body weight, fitness and improve lipid balance. Patient education provided. They understand and agree with this course of treatment. They will return with new or worsening symptoms. Patient instructed to remain current with appropriate annual health maintenance.          Relevant Orders    Comprehensive Metabolic Panel    Lipid Panel    Hemoglobin A1C    Albumin-Creatinine Ratio, Urine Random    Follow Up In [facility] - PCP - Established    PAD (peripheral artery disease) (CMS-HCC)     Chronic Condition Documentation : Stable based on symptoms and exam.  Continue established treatment plan and follow-up at least yearly.         Class 2 severe obesity due to excess calories with serious comorbidity and body mass index (BMI) of 37.0 to 37.9 in adult     Continue decrease calorie diet and not more than 1500 calorie per day diet and low-fat diet.  Continue with regular exercise program.  We advised exercise at least 5 days a week for at least 45  minutes and also a minimum of 10,000 steps a day.  The detrimental effects of obesity on health were discussed.         Type 2 diabetes mellitus without complication, without long-term current use of insulin (Multi)     Diabetes Mellitus type II, under good control.  1. Rx changes: Increase Ozempic to 2 mg weekly.  2. Education: Reviewed ‘ABCs’ of diabetes management (respective goals in parentheses):  A1C (<7), blood pressure (<130/80), and cholesterol (LDL <100).  3. Compliance at present is estimated to be good. Efforts to improve compliance (if necessary) will be directed at dietary modifications: and increased exercise.  4. Follow up: 5 months         Relevant Medications    semaglutide 2 mg/dose (8 mg/3 mL) pen injector    Other Relevant Orders    Comprehensive Metabolic Panel    Lipid Panel    Hemoglobin A1C    Albumin-Creatinine Ratio, Urine Random    Follow Up In [facility] - PCP - Established    Routine general medical examination at a health care facility - Primary     Recommend low-cholesterol diet, low-fat diet and low-salt diet.  The need for lifelong dietary compliance in order to reduce cardiac risk is recommended.  We will also recommend regular exercise program to improve lipid balance and overall health.  Recommend decreasing fat and cholesterol in diet, increasing aerobic exercise with a goal of 4 or more days per week.          Scribe Attestation  By signing my name below, IHarvey Scribe   attest that this documentation has been prepared under the direction and in the presence of Usama Giang MD.

## 2025-03-11 NOTE — PATIENT INSTRUCTIONS
BMI was above normal measurement. Current weight: 127 kg (279 lb)  Weight change since last visit (-) denotes wt loss -1.2 lbs   Weight loss needed to achieve BMI 25: 95.1 Lbs  Weight loss needed to achieve BMI 30: 58.3 Lbs  Advised to Increase physical activity.

## 2025-03-11 NOTE — ASSESSMENT & PLAN NOTE
Recommendations: decrease sodium in the diet, elevate feet above the level of the heart whenever possible, increase physical activity, use of compression stockings, and weight loss.

## 2025-03-11 NOTE — ASSESSMENT & PLAN NOTE
Diabetes Mellitus type II, under good control.  1. Rx changes: Increase Ozempic to 2 mg weekly.  2. Education: Reviewed ‘ABCs’ of diabetes management (respective goals in parentheses):  A1C (<7), blood pressure (<130/80), and cholesterol (LDL <100).  3. Compliance at present is estimated to be good. Efforts to improve compliance (if necessary) will be directed at dietary modifications: and increased exercise.  4. Follow up: 5 months

## 2025-03-13 DIAGNOSIS — E11.9 TYPE 2 DIABETES MELLITUS WITHOUT COMPLICATION, WITHOUT LONG-TERM CURRENT USE OF INSULIN (MULTI): ICD-10-CM

## 2025-03-13 DIAGNOSIS — E78.2 MIXED HYPERLIPIDEMIA: ICD-10-CM

## 2025-03-13 DIAGNOSIS — I10 ESSENTIAL HYPERTENSION: ICD-10-CM

## 2025-03-13 DIAGNOSIS — Z12.5 SCREENING FOR PROSTATE CANCER: ICD-10-CM

## 2025-03-13 LAB
ALBUMIN SERPL-MCNC: 4.7 G/DL (ref 3.6–5.1)
ALBUMIN/CREAT UR: ABNORMAL MG/G CREAT
ALP SERPL-CCNC: 87 U/L (ref 35–144)
ALT SERPL-CCNC: 15 U/L (ref 9–46)
ANION GAP SERPL CALCULATED.4IONS-SCNC: 9 MMOL/L (CALC) (ref 7–17)
AST SERPL-CCNC: 18 U/L (ref 10–35)
BILIRUB SERPL-MCNC: 0.9 MG/DL (ref 0.2–1.2)
BUN SERPL-MCNC: 15 MG/DL (ref 7–25)
CALCIUM SERPL-MCNC: 9.3 MG/DL (ref 8.6–10.3)
CHLORIDE SERPL-SCNC: 99 MMOL/L (ref 98–110)
CHOLEST SERPL-MCNC: 143 MG/DL
CHOLEST/HDLC SERPL: 3.4 (CALC)
CO2 SERPL-SCNC: 32 MMOL/L (ref 20–32)
CREAT SERPL-MCNC: 0.91 MG/DL (ref 0.7–1.35)
CREAT UR-MCNC: 13 MG/DL (ref 20–320)
EGFRCR SERPLBLD CKD-EPI 2021: 94 ML/MIN/1.73M2
EST. AVERAGE GLUCOSE BLD GHB EST-MCNC: 128 MG/DL
EST. AVERAGE GLUCOSE BLD GHB EST-SCNC: 7.1 MMOL/L
GLUCOSE SERPL-MCNC: 108 MG/DL (ref 65–99)
HBA1C MFR BLD: 6.1 % OF TOTAL HGB
HDLC SERPL-MCNC: 42 MG/DL
LDLC SERPL CALC-MCNC: 76 MG/DL (CALC)
MICROALBUMIN UR-MCNC: <0.2 MG/DL
NONHDLC SERPL-MCNC: 101 MG/DL (CALC)
POTASSIUM SERPL-SCNC: 4.6 MMOL/L (ref 3.5–5.3)
PROT SERPL-MCNC: 7.5 G/DL (ref 6.1–8.1)
SODIUM SERPL-SCNC: 140 MMOL/L (ref 135–146)
TRIGL SERPL-MCNC: 148 MG/DL

## 2025-04-01 ENCOUNTER — TELEPHONE (OUTPATIENT)
Dept: SLEEP MEDICINE | Facility: CLINIC | Age: 65
End: 2025-04-01
Payer: MEDICARE

## 2025-04-01 NOTE — TELEPHONE ENCOUNTER
Spoke with Haydee from [facility].    Insurance is denying a Bipap.    The Split Study that was completed on 10/11/24 showed RYAN resolved w/auto CPAP.    They need an addended note stating that patient tried and failed CPAP.    Please review and advise.    Thanks

## 2025-05-05 DIAGNOSIS — J45.909 ASTHMA, UNSPECIFIED ASTHMA SEVERITY, UNSPECIFIED WHETHER COMPLICATED, UNSPECIFIED WHETHER PERSISTENT (HHS-HCC): ICD-10-CM

## 2025-05-06 RX ORDER — TIOTROPIUM BROMIDE INHALATION SPRAY 1.56 UG/1
2 SPRAY, METERED RESPIRATORY (INHALATION) DAILY
Qty: 12 G | Refills: 3 | Status: SHIPPED | OUTPATIENT
Start: 2025-05-06

## 2025-05-14 ENCOUNTER — APPOINTMENT (OUTPATIENT)
Dept: CARDIOLOGY | Facility: CLINIC | Age: 65
End: 2025-05-14
Payer: MEDICARE

## 2025-05-14 VITALS
HEIGHT: 72 IN | RESPIRATION RATE: 18 BRPM | WEIGHT: 275.2 LBS | SYSTOLIC BLOOD PRESSURE: 124 MMHG | OXYGEN SATURATION: 95 % | BODY MASS INDEX: 37.27 KG/M2 | DIASTOLIC BLOOD PRESSURE: 68 MMHG | HEART RATE: 89 BPM

## 2025-05-14 DIAGNOSIS — I10 ESSENTIAL HYPERTENSION: ICD-10-CM

## 2025-05-14 DIAGNOSIS — I25.10 CAD S/P PERCUTANEOUS CORONARY ANGIOPLASTY: Primary | ICD-10-CM

## 2025-05-14 DIAGNOSIS — I49.1 PREMATURE ATRIAL COMPLEXES: ICD-10-CM

## 2025-05-14 DIAGNOSIS — E78.2 MIXED HYPERLIPIDEMIA: ICD-10-CM

## 2025-05-14 DIAGNOSIS — R00.2 PALPITATIONS: ICD-10-CM

## 2025-05-14 DIAGNOSIS — Z98.61 CAD S/P PERCUTANEOUS CORONARY ANGIOPLASTY: Primary | ICD-10-CM

## 2025-05-14 PROBLEM — I20.89 EFFORT ANGINA: Status: RESOLVED | Noted: 2023-05-31 | Resolved: 2025-05-14

## 2025-05-14 PROBLEM — R07.9 CHEST PAIN: Status: RESOLVED | Noted: 2023-05-31 | Resolved: 2025-05-14

## 2025-05-14 PROCEDURE — 3078F DIAST BP <80 MM HG: CPT | Performed by: NURSE PRACTITIONER

## 2025-05-14 PROCEDURE — 3074F SYST BP LT 130 MM HG: CPT | Performed by: NURSE PRACTITIONER

## 2025-05-14 PROCEDURE — 1160F RVW MEDS BY RX/DR IN RCRD: CPT | Performed by: NURSE PRACTITIONER

## 2025-05-14 PROCEDURE — 3008F BODY MASS INDEX DOCD: CPT | Performed by: NURSE PRACTITIONER

## 2025-05-14 PROCEDURE — 1036F TOBACCO NON-USER: CPT | Performed by: NURSE PRACTITIONER

## 2025-05-14 PROCEDURE — 1159F MED LIST DOCD IN RCRD: CPT | Performed by: NURSE PRACTITIONER

## 2025-05-14 PROCEDURE — 93000 ELECTROCARDIOGRAM COMPLETE: CPT | Performed by: NURSE PRACTITIONER

## 2025-05-14 PROCEDURE — 99214 OFFICE O/P EST MOD 30 MIN: CPT | Performed by: NURSE PRACTITIONER

## 2025-05-14 RX ORDER — SPIRONOLACTONE 25 MG/1
25 TABLET ORAL DAILY
Qty: 90 TABLET | Refills: 3 | Status: SHIPPED | OUTPATIENT
Start: 2025-05-14 | End: 2026-05-14

## 2025-05-14 NOTE — PATIENT INSTRUCTIONS
- Echocardiogram (ultrasound of your heart) to assess the function as well as for any valve abnormalities  - 2 day monitor  - blood work (magnesium, thyroid function)  - stop lasix & potassium, start spironolactone  - next year if LDL still > 70, will start Zetia  - Follow up with Dr. Coulter after testing & me next year    It was so nice to see you! As always, I look forward to being your cardiac Nurse Practitioner. I am a huge believer in communicating with my patients. Please contact me at any time, if anything is not clear to you regarding anything we have discussed, or if new questions occur to you.

## 2025-05-14 NOTE — PROGRESS NOTES
Name : Simba Washington   : 1960   MRN : [id number]   ENC Date : 2025    CC: Hypertension, Hyperlipidemia, Coronary Artery Disease, COPD, RYAN on BiPAP, and Annual Exam     HPI:    Simba Washington is a 65 y.o. male PMHx sig for CAD s/p PCI LAD TARA (2013), HTN, HLD, COPD & RYAN on BiPap who presents today for annual cardiovascular follow up.     He has done very well since his last visit. Reports no major health issues and has had no hospitalizations. Denies any chest pain, pressure, SOB/RODGERS, PND, orthopnea, LE edema, palpitations, lightheadedness, dizziness, or syncope at rest or with exertions. He is compliant with his medications and reports no side effects. He has been physically active.     Weight down: 296-> 275 lbs this year    Retired from [employer] in 2024. Got a puppy the week he retired. Still very active with this dog.    Come to find out, his brother Arley is actually a patient of mine.    CV Diagnostics:  Stress echo 8/3/22: Normal. EF 60% at baseline & 75% at peak.    Echo 18: EF 55-60%, LVH, mild TR    ROS: unless otherwise noted in the history of present illness, all other systems were reviewed and they are negative for complaints     Allergies:  Patient has no known allergies.    Current Outpatient Medications   Medication Instructions    aspirin-calcium carbonate 81 mg-300 mg calcium(777 mg) tablet 1 tablet, Daily    atorvastatin (LIPITOR) 80 mg, oral, Daily    carvedilol (COREG) 6.25 mg, oral, 2 times daily (morning and late afternoon)    Dulera 100-5 mcg/actuation inhaler 2 puffs, inhalation, 2 times daily    fluticasone (Flonase) 50 mcg/actuation nasal spray 2 sprays, Each Nostril, Daily    nitroglycerin (Nitrostat) 0.4 mg SL tablet Place under the tongue.    OneTouch Delica Plus Lancet 33 gauge misc USE THREE TIMES A DAY TO TEST BLOOD SUGAR    OneTouch Ultra Test strip Use to test blood sugar 3 times a day    OneTouch Ultra2 Meter misc USE TO TEST BLOOD SUGAR 3 TIMES  DAILY     semaglutide 2 mg, subcutaneous, Weekly    Spiriva Respimat 1.25 mcg/actuation inhaler 2 puffs, inhalation, Daily    spironolactone (ALDACTONE) 25 mg, oral, Daily        Last Labs:  CBC  Lab Results   Component Value Date    WBC 8.3 05/31/2024    HGB 16.0 05/31/2024    HCT 51.8 05/31/2024    MCV 92 05/31/2024     05/31/2024       CMP  Lab Results   Component Value Date    CALCIUM 9.3 03/12/2025    PHOS 3.5 08/03/2018    PROT 7.5 03/12/2025    ALBUMIN 4.7 03/12/2025    AST 18 03/12/2025    ALT 15 03/12/2025    ALKPHOS 87 03/12/2025    BILITOT 0.9 03/12/2025       BMP   Lab Results   Component Value Date     03/12/2025    K 4.6 03/12/2025    CL 99 03/12/2025    CO2 32 03/12/2025    GLUCOSE 108 (H) 03/12/2025    BUN 15 03/12/2025    CREATININE 0.91 03/12/2025       LIPID PANEL   Lab Results   Component Value Date    CHOL 143 03/12/2025    TRIG 148 03/12/2025    HDL 42 03/12/2025    CHHDL 3.4 03/12/2025    LDLF 79 06/08/2023    VLDL 29 09/16/2024    NHDL 101 03/12/2025       RENAL FUNCTION PANEL   Lab Results   Component Value Date    GLUCOSE 108 (H) 03/12/2025     03/12/2025    K 4.6 03/12/2025    CL 99 03/12/2025    CO2 32 03/12/2025    ANIONGAP 9 03/12/2025    BUN 15 03/12/2025    CREATININE 0.91 03/12/2025    GFRMALE >90 06/08/2023    CALCIUM 9.3 03/12/2025    PHOS 3.5 08/03/2018    ALBUMIN 4.7 03/12/2025        Lab Results   Component Value Date    BNP 12 08/28/2019    HGBA1C 6.1 (H) 03/12/2025     I have reviewed the above labs & diagnostics    Last Recorded Vitals:  Vitals:    05/14/25 1437   BP: 124/68   BP Location: Left arm   Patient Position: Sitting   Pulse: 89   Resp: 18   SpO2: 95%   Weight: 125 kg (275 lb 3.2 oz)   Height: 1.829 m (6')     Physical Exam:  On exam Mr. Simba Washington appears his stated age, is alert and oriented x3, and in no acute distress. His sclera are anicteric and his oropharynx has moist mucous membranes. His neck is supple and without thyromegaly. The JVP is ~5  cm of water above the right atrium. His cardiac exam has regular rhythm, normal S1, S2. No S3/4. There are no murmurs. His lungs are clear to auscultation bilaterally and there is no dullness to percussion. His abdomen is soft, nontender with normoactive bowel sounds. There is no HJR. The extremities are warm and without edema. The skin is dry. There is no rash present. The distal pulses are 2-3+ in all four extremities. His mood and affect are appropriate for todays encounter.     Assessment/Plan:  1. CAD s/p PCI LAD TARA (09/2013). EKG is baseline. No need for routine stress test based on ISCHEMIA trial.  - c/w ASA 81mg QD  - c/w Atorvastatin 80mg QD  - c/w BB  - last echo in 2018, will repeat for surveillance     2. Frequent PACs on EKG today. This is different from last year. Will get a 48hr monitor to assess burden. May need to increase coreg.    3. Hypertension. Well controlled. However he is on lasix with potassium chaser, so I'm going to trial conversion to spironolactone.     4. Hyperlipidemia. LDL > 70 (76). Historically higher than 70. As Simba is actively losing weight, will wait for repeat lipid panel & if that one is still elevated, will start Zetia.     5. COPD. Following with Dr. Cottrell     6. RYAN on BiPAP    Follow up with Dr. Coulter after to review results & me in 1 year or sooner if needed    Tracy M Schwab, APRN-CNP

## 2025-05-27 ENCOUNTER — APPOINTMENT (OUTPATIENT)
Dept: CARDIOLOGY | Facility: HOSPITAL | Age: 65
End: 2025-05-27
Payer: MEDICARE

## 2025-05-28 ENCOUNTER — HOSPITAL ENCOUNTER (OUTPATIENT)
Dept: CARDIOLOGY | Facility: HOSPITAL | Age: 65
Discharge: HOME | End: 2025-05-28
Payer: MEDICARE

## 2025-05-28 DIAGNOSIS — I25.10 CAD S/P PERCUTANEOUS CORONARY ANGIOPLASTY: ICD-10-CM

## 2025-05-28 DIAGNOSIS — Z98.61 CAD S/P PERCUTANEOUS CORONARY ANGIOPLASTY: ICD-10-CM

## 2025-05-28 DIAGNOSIS — I49.1 PREMATURE ATRIAL COMPLEXES: ICD-10-CM

## 2025-05-28 LAB
AORTIC VALVE MEAN GRADIENT: 3 MMHG
AORTIC VALVE PEAK VELOCITY: 0.98 M/S
AV PEAK GRADIENT: 4 MMHG
AVA (PEAK VEL): 2.97 CM2
AVA (VTI): 2.47 CM2
EJECTION FRACTION APICAL 4 CHAMBER: 61.4
EJECTION FRACTION: 64 %
LEFT ATRIUM VOLUME AREA LENGTH INDEX BSA: 19.6 ML/M2
LEFT VENTRICLE INTERNAL DIMENSION DIASTOLE: 4.93 CM (ref 3.5–6)
LEFT VENTRICULAR OUTFLOW TRACT DIAMETER: 2 CM
LV EJECTION FRACTION BIPLANE: 64 %
MITRAL VALVE E/A RATIO: 1.02
MITRAL VALVE E/E' RATIO: 9.2
RIGHT VENTRICLE FREE WALL PEAK S': 12 CM/S
RIGHT VENTRICLE PEAK SYSTOLIC PRESSURE: 26.2 MMHG
TRICUSPID ANNULAR PLANE SYSTOLIC EXCURSION: 2.9 CM

## 2025-05-28 PROCEDURE — 2500000004 HC RX 250 GENERAL PHARMACY W/ HCPCS (ALT 636 FOR OP/ED): Mod: JW | Performed by: NURSE PRACTITIONER

## 2025-05-28 PROCEDURE — 93225 XTRNL ECG REC<48 HRS REC: CPT

## 2025-05-28 PROCEDURE — C8929 TTE W OR WO FOL WCON,DOPPLER: HCPCS

## 2025-05-28 PROCEDURE — 93306 TTE W/DOPPLER COMPLETE: CPT | Performed by: INTERNAL MEDICINE

## 2025-05-28 RX ADMIN — PERFLUTREN 2 ML OF DILUTION: 6.52 INJECTION, SUSPENSION INTRAVENOUS at 10:50

## 2025-06-04 ENCOUNTER — APPOINTMENT (OUTPATIENT)
Dept: PULMONOLOGY | Facility: CLINIC | Age: 65
End: 2025-06-04
Payer: MEDICARE

## 2025-06-04 ENCOUNTER — OFFICE VISIT (OUTPATIENT)
Dept: CARDIOLOGY | Facility: CLINIC | Age: 65
End: 2025-06-04
Payer: MEDICARE

## 2025-06-04 VITALS
HEIGHT: 72 IN | HEART RATE: 87 BPM | DIASTOLIC BLOOD PRESSURE: 68 MMHG | WEIGHT: 276 LBS | OXYGEN SATURATION: 97 % | BODY MASS INDEX: 37.38 KG/M2 | SYSTOLIC BLOOD PRESSURE: 112 MMHG

## 2025-06-04 DIAGNOSIS — I25.10 CAD S/P PERCUTANEOUS CORONARY ANGIOPLASTY: Primary | ICD-10-CM

## 2025-06-04 DIAGNOSIS — Z98.61 CAD S/P PERCUTANEOUS CORONARY ANGIOPLASTY: Primary | ICD-10-CM

## 2025-06-04 DIAGNOSIS — E78.2 MIXED HYPERLIPIDEMIA: ICD-10-CM

## 2025-06-04 DIAGNOSIS — I10 ESSENTIAL HYPERTENSION: ICD-10-CM

## 2025-06-04 PROCEDURE — 99212 OFFICE O/P EST SF 10 MIN: CPT | Performed by: INTERNAL MEDICINE

## 2025-06-04 PROCEDURE — 3008F BODY MASS INDEX DOCD: CPT | Performed by: INTERNAL MEDICINE

## 2025-06-04 PROCEDURE — 3074F SYST BP LT 130 MM HG: CPT | Performed by: INTERNAL MEDICINE

## 2025-06-04 PROCEDURE — 3078F DIAST BP <80 MM HG: CPT | Performed by: INTERNAL MEDICINE

## 2025-06-04 PROCEDURE — 1159F MED LIST DOCD IN RCRD: CPT | Performed by: INTERNAL MEDICINE

## 2025-06-04 NOTE — PROGRESS NOTES
[facility] [place]     Cardiology Office Follow-up: Follow-up (Test results)    Patient previously seen for  CAD s/p PCI LAD TARA (09/2013), HTN, HLD, COPD & RYAN on BiPap.    At the previous encounter, the patient seen and evaluated.  Mallika took him off furosemide.    After the encounter the patient completed the following testing: ECHO normal.    There were no interval changes in medical history before this appointment.    Today the patient reports: No palpitations.  Occasionally lightheaded and dizzy.  Better since the med adjustment.    Additional recent non-cardiac testing includes: None    ROS: Remainder of 12 review of systems is negative aside from chief complaint.    PHYSICAL EXAM  Vitals:    06/04/25 1558   BP: 112/68   BP Location: Left arm   Patient Position: Sitting   Pulse: 87   SpO2: 97%   Weight: 125 kg (276 lb)   Height: 1.829 m (6')     General: No acute distress, appears comfortable  Cardiac: Regular rate and rhythm with no murmurs rubs or gallops, normal S1-S2  Pulmonary: Clear to auscultation bilaterally with no rales or rhonchi, normal respiratory effort  Gastrointestinal: Soft abdomen with no tenderness or guarding, no rebound  Musculoskeletal: No lower extremity edema, normal  Neurological: Alert and oriented x 4, appropriate, moving all extremities well, normal affect  Psychiatric: Normal affect, mood appropriate to occasion  Skin: No rashes, hives or jaundice  HEENT: Normocephalic, atraumatic.  Pupils equal round and reactive.    Assessment/Plan   Diagnoses and all orders for this visit:  CAD S/P percutaneous coronary angioplasty  Essential hypertension  Mixed hyperlipidemia      Assessment and plan (narrative):    Simba Washington is a 65 y.o. male with CAD status post PCI, hypertension, hyperlipidemia with no significant symptoms and no changes in current clinical status.  Negative noninvasive testing.    Minimally symptomatic from PAC, does have wenkebach.  No changes at this  time.    Followup: As previously scheduled    Elvin Coulter MD    Director of Interventional Cardiology  [facility] [place] [facility] [place]

## 2025-06-10 ENCOUNTER — APPOINTMENT (OUTPATIENT)
Facility: CLINIC | Age: 65
End: 2025-06-10
Payer: MEDICARE

## 2025-07-24 LAB
ALBUMIN SERPL-MCNC: 4.4 G/DL (ref 3.6–5.1)
ALP SERPL-CCNC: 82 U/L (ref 35–144)
ALT SERPL-CCNC: 15 U/L (ref 9–46)
ANION GAP SERPL CALCULATED.4IONS-SCNC: 8 MMOL/L (CALC) (ref 7–17)
AST SERPL-CCNC: 20 U/L (ref 10–35)
BILIRUB SERPL-MCNC: 0.7 MG/DL (ref 0.2–1.2)
BUN SERPL-MCNC: 12 MG/DL (ref 7–25)
CALCIUM SERPL-MCNC: 9.6 MG/DL (ref 8.6–10.3)
CHLORIDE SERPL-SCNC: 98 MMOL/L (ref 98–110)
CHOLEST SERPL-MCNC: 125 MG/DL
CHOLEST/HDLC SERPL: 2.9 (CALC)
CO2 SERPL-SCNC: 31 MMOL/L (ref 20–32)
CREAT SERPL-MCNC: 0.87 MG/DL (ref 0.7–1.35)
EGFRCR SERPLBLD CKD-EPI 2021: 95 ML/MIN/1.73M2
EST. AVERAGE GLUCOSE BLD GHB EST-MCNC: 120 MG/DL
EST. AVERAGE GLUCOSE BLD GHB EST-SCNC: 6.6 MMOL/L
GLUCOSE SERPL-MCNC: 100 MG/DL (ref 65–99)
HBA1C MFR BLD: 5.8 %
HDLC SERPL-MCNC: 43 MG/DL
LDLC SERPL CALC-MCNC: 61 MG/DL (CALC)
NONHDLC SERPL-MCNC: 82 MG/DL (CALC)
POTASSIUM SERPL-SCNC: 4.9 MMOL/L (ref 3.5–5.3)
PROT SERPL-MCNC: 7.3 G/DL (ref 6.1–8.1)
SODIUM SERPL-SCNC: 137 MMOL/L (ref 135–146)
TRIGL SERPL-MCNC: 124 MG/DL

## 2025-07-25 DIAGNOSIS — E11.9 TYPE 2 DIABETES MELLITUS WITHOUT COMPLICATION, WITHOUT LONG-TERM CURRENT USE OF INSULIN: ICD-10-CM

## 2025-07-25 RX ORDER — SEMAGLUTIDE 2.68 MG/ML
INJECTION, SOLUTION SUBCUTANEOUS
Qty: 9 ML | Refills: 0 | Status: SHIPPED | OUTPATIENT
Start: 2025-07-25

## 2025-08-01 DIAGNOSIS — E11.9 TYPE 2 DIABETES MELLITUS WITHOUT COMPLICATION, WITHOUT LONG-TERM CURRENT USE OF INSULIN: ICD-10-CM

## 2025-08-01 DIAGNOSIS — I10 ESSENTIAL HYPERTENSION: ICD-10-CM

## 2025-08-01 DIAGNOSIS — E78.2 MIXED HYPERLIPIDEMIA: ICD-10-CM

## 2025-08-01 DIAGNOSIS — Z12.5 SCREENING FOR PROSTATE CANCER: ICD-10-CM

## 2025-08-04 DIAGNOSIS — I25.10 ATHEROSCLEROSIS OF NATIVE CORONARY ARTERY OF NATIVE HEART WITHOUT ANGINA PECTORIS: ICD-10-CM

## 2025-08-04 DIAGNOSIS — I25.10 CAD S/P PERCUTANEOUS CORONARY ANGIOPLASTY: ICD-10-CM

## 2025-08-04 DIAGNOSIS — Z98.61 CAD S/P PERCUTANEOUS CORONARY ANGIOPLASTY: ICD-10-CM

## 2025-08-05 RX ORDER — ATORVASTATIN CALCIUM 80 MG/1
80 TABLET, FILM COATED ORAL DAILY
Qty: 90 TABLET | Refills: 3 | Status: SHIPPED | OUTPATIENT
Start: 2025-08-05

## 2025-08-05 RX ORDER — CARVEDILOL 6.25 MG/1
6.25 TABLET ORAL
Qty: 180 TABLET | Refills: 3 | Status: SHIPPED | OUTPATIENT
Start: 2025-08-05

## 2025-08-06 ENCOUNTER — APPOINTMENT (OUTPATIENT)
Dept: PRIMARY CARE | Facility: CLINIC | Age: 65
End: 2025-08-06
Payer: MEDICARE

## 2025-08-06 VITALS
HEART RATE: 95 BPM | BODY MASS INDEX: 37.25 KG/M2 | OXYGEN SATURATION: 96 % | TEMPERATURE: 97.6 F | HEIGHT: 72 IN | WEIGHT: 275 LBS | SYSTOLIC BLOOD PRESSURE: 110 MMHG | RESPIRATION RATE: 14 BRPM | DIASTOLIC BLOOD PRESSURE: 64 MMHG

## 2025-08-06 DIAGNOSIS — E78.2 MIXED HYPERLIPIDEMIA: ICD-10-CM

## 2025-08-06 DIAGNOSIS — Z12.5 SCREENING FOR MALIGNANT NEOPLASM OF PROSTATE: ICD-10-CM

## 2025-08-06 DIAGNOSIS — Z00.00 ANNUAL PHYSICAL EXAM: Primary | ICD-10-CM

## 2025-08-06 DIAGNOSIS — I10 ESSENTIAL HYPERTENSION: ICD-10-CM

## 2025-08-06 DIAGNOSIS — E11.9 TYPE 2 DIABETES MELLITUS WITHOUT COMPLICATION, WITHOUT LONG-TERM CURRENT USE OF INSULIN: ICD-10-CM

## 2025-08-06 PROCEDURE — 3074F SYST BP LT 130 MM HG: CPT | Performed by: FAMILY MEDICINE

## 2025-08-06 PROCEDURE — 3078F DIAST BP <80 MM HG: CPT | Performed by: FAMILY MEDICINE

## 2025-08-06 PROCEDURE — 1159F MED LIST DOCD IN RCRD: CPT | Performed by: FAMILY MEDICINE

## 2025-08-06 PROCEDURE — 3008F BODY MASS INDEX DOCD: CPT | Performed by: FAMILY MEDICINE

## 2025-08-06 PROCEDURE — 99214 OFFICE O/P EST MOD 30 MIN: CPT | Performed by: FAMILY MEDICINE

## 2025-08-06 PROCEDURE — 1160F RVW MEDS BY RX/DR IN RCRD: CPT | Performed by: FAMILY MEDICINE

## 2025-08-06 PROCEDURE — 99397 PER PM REEVAL EST PAT 65+ YR: CPT | Performed by: FAMILY MEDICINE

## 2025-08-06 RX ORDER — SEMAGLUTIDE 2.68 MG/ML
2 INJECTION, SOLUTION SUBCUTANEOUS WEEKLY
Qty: 9 ML | Refills: 1 | Status: SHIPPED | OUTPATIENT
Start: 2025-08-06

## 2025-08-06 ASSESSMENT — ENCOUNTER SYMPTOMS
CHILLS: 0
VISUAL CHANGE: 0
NERVOUS/ANXIOUS: 0
FEVER: 0
DIZZINESS: 0
BLURRED VISION: 0
PALPITATIONS: 0
SEIZURES: 0
POLYPHAGIA: 0
HEMATURIA: 0
TREMORS: 0
VOMITING: 0
SHORTNESS OF BREATH: 0
FREQUENCY: 0
BRUISES/BLEEDS EASILY: 0
POLYDIPSIA: 0
DIARRHEA: 0
COUGH: 0
FATIGUE: 0
NUMBNESS: 0
BLACKOUTS: 0
DIABETIC ASSOCIATED SYMPTOMS: 0
ADENOPATHY: 0
WEAKNESS: 0
HUNGER: 0
SORE THROAT: 0
SWEATS: 0
WHEEZING: 0
HEADACHES: 0
CONFUSION: 0
WEIGHT LOSS: 0
SPEECH DIFFICULTY: 0
RHINORRHEA: 0
ABDOMINAL PAIN: 0
CONSTIPATION: 0
DYSURIA: 0

## 2025-08-06 NOTE — ASSESSMENT & PLAN NOTE
Well-controlled, continue current medications and management.   The nature of cardiac risk has been fully discussed with this patient. Discussed cardiovascular risk analysis and appropriate diet with the need for lifelong measures to reduce the risk. A regular exercise program is recommended to help achieve and maintain normal body weight, fitness and improve lipid balance. Patient education provided. They understand and agree with this course of treatment. They will return with new or worsening symptoms. Patient instructed to remain current with appropriate annual health maintenance.

## 2025-08-06 NOTE — ASSESSMENT & PLAN NOTE
"Well-controlled, continue current medications and management.   Dietary sodium restriction.  Regular aerobic exercise program is recommended to help achieve and maintain normal body weight, fitness and improve lipid balance. .  Dietary changes: Increase soluble fiber  Plant sterols 2grams per day (e.g. Benecol)  Reduce saturated fat, \"trans\" monounsaturated fatty acids, and cholesterol      "

## 2025-08-06 NOTE — PROGRESS NOTES
Subjective   Patient ID: Simba Washington is a 65 y.o. male who presents for Follow-up (Diabetes, Hypertension, Hyperlipidemia/).    Patient is here for physical exam for follow-up on diabetes, hypertension and hyperlipidemia. He  exercising and is adherent to a low-salt diet. Patient denies chest pain, dyspnea, exertional chest pressure/discomfort, near-syncope, orthopnea, palpitations, paroxysmal nocturnal dyspnea, and syncope. Taking his medication regularly with no side effects.     Diabetes  He presents for his follow-up diabetic visit. He has type 2 diabetes mellitus. No MedicAlert identification noted. His disease course has been stable. There are no hypoglycemic associated symptoms. Pertinent negatives for hypoglycemia include no confusion, dizziness, headaches, hunger, mood changes, nervousness/anxiousness, pallor, seizures, sleepiness, speech difficulty, sweats or tremors. There are no diabetic associated symptoms. Pertinent negatives for diabetes include no blurred vision, no chest pain, no fatigue, no foot paresthesias, no foot ulcerations, no polydipsia, no polyphagia, no polyuria, no visual change, no weakness and no weight loss. There are no hypoglycemic complications. Pertinent negatives for hypoglycemia complications include no blackouts, no hospitalization, no nocturnal hypoglycemia, no required assistance and no required glucagon injection. Symptoms are stable. His breakfast blood glucose is taken between 8-9 am. His breakfast blood glucose range is generally  mg/dl.        Review of Systems   Constitutional:  Negative for chills, fatigue, fever and weight loss.   HENT:  Negative for congestion, ear pain, nosebleeds, rhinorrhea and sore throat.    Eyes:  Negative for blurred vision.   Respiratory:  Negative for cough, shortness of breath and wheezing.    Cardiovascular:  Negative for chest pain, palpitations and leg swelling.   Gastrointestinal:  Negative for abdominal pain, constipation,  diarrhea and vomiting.   Endocrine: Negative for polydipsia, polyphagia and polyuria.   Genitourinary:  Negative for dysuria, frequency and hematuria.   Skin:  Negative for pallor.   Neurological:  Negative for dizziness, tremors, seizures, speech difficulty, weakness, numbness and headaches.   Hematological:  Negative for adenopathy. Does not bruise/bleed easily.   Psychiatric/Behavioral:  Negative for confusion. The patient is not nervous/anxious.        Objective   /64   Pulse 95   Temp 36.4 °C (97.6 °F)   Resp 14   Ht 1.829 m (6')   Wt 125 kg (275 lb)   SpO2 96%   BMI 37.30 kg/m²     Physical Exam  Constitutional:       General: He is not in acute distress.     Appearance: Normal appearance.   HENT:      Head: Normocephalic and atraumatic.      Mouth/Throat:      Mouth: Mucous membranes are moist.      Pharynx: Oropharynx is clear. No oropharyngeal exudate or posterior oropharyngeal erythema.     Eyes:      General: No scleral icterus.     Extraocular Movements: Extraocular movements intact.      Pupils: Pupils are equal, round, and reactive to light.       Cardiovascular:      Rate and Rhythm: Normal rate and regular rhythm.      Pulses: Normal pulses.      Heart sounds: No murmur heard.     No friction rub. No gallop.   Pulmonary:      Effort: Pulmonary effort is normal.      Breath sounds: No wheezing, rhonchi or rales.     Musculoskeletal:      Right lower leg: No edema.      Left lower leg: No edema.     Skin:     General: Skin is warm.      Coloration: Skin is not jaundiced or pale.      Findings: No erythema or rash.     Neurological:      General: No focal deficit present.      Mental Status: He is alert and oriented to person, place, and time.      Cranial Nerves: No cranial nerve deficit.      Sensory: No sensory deficit.      Coordination: Coordination normal.      Gait: Gait normal.         Orders Only on 07/23/2025   Component Date Value Ref Range Status    CHOLESTEROL, TOTAL 07/23/2025  125  <200 mg/dL Final    HDL CHOLESTEROL 07/23/2025 43  > OR = 40 mg/dL Final    TRIGLYCERIDES 07/23/2025 124  <150 mg/dL Final    LDL-CHOLESTEROL 07/23/2025 61  mg/dL (calc) Final    Comment: Reference range: <100     Desirable range <100 mg/dL for primary prevention;    <70 mg/dL for patients with CHD or diabetic patients   with > or = 2 CHD risk factors.     LDL-C is now calculated using the ErmiasPereira   calculation, which is a validated novel method providing   better accuracy than the Friedewald equation in the   estimation of LDL-C.   Leonardo NARAYANAN et al. BETTY. 2013;310(19): 5019-1518   (http://[employer]/faq/RGW157)      CHOL/HDLC RATIO 07/23/2025 2.9  <5.0 (calc) Final    NON HDL CHOLESTEROL 07/23/2025 82  <130 mg/dL (calc) Final    Comment: For patients with diabetes plus 1 major ASCVD risk   factor, treating to a non-HDL-C goal of <100 mg/dL   (LDL-C of <70 mg/dL) is considered a therapeutic   option.      GLUCOSE 07/23/2025 100 (H)  65 - 99 mg/dL Final    Comment:               Fasting reference interval     For someone without known diabetes, a glucose value  between 100 and 125 mg/dL is consistent with  prediabetes and should be confirmed with a  follow-up test.         UREA NITROGEN (BUN) 07/23/2025 12  7 - 25 mg/dL Final    CREATININE 07/23/2025 0.87  0.70 - 1.35 mg/dL Final    EGFR 07/23/2025 95  > OR = 60 mL/min/1.73m2 Final    SODIUM 07/23/2025 137  135 - 146 mmol/L Final    POTASSIUM 07/23/2025 4.9  3.5 - 5.3 mmol/L Final    CHLORIDE 07/23/2025 98  98 - 110 mmol/L Final    CARBON DIOXIDE 07/23/2025 31  20 - 32 mmol/L Final    ELECTROLYTE BALANCE 07/23/2025 8  7 - 17 mmol/L (calc) Final    CALCIUM 07/23/2025 9.6  8.6 - 10.3 mg/dL Final    PROTEIN, TOTAL 07/23/2025 7.3  6.1 - 8.1 g/dL Final    ALBUMIN 07/23/2025 4.4  3.6 - 5.1 g/dL Final    BILIRUBIN, TOTAL 07/23/2025 0.7  0.2 - 1.2 mg/dL Final    ALKALINE PHOSPHATASE 07/23/2025 82  35 - 144 U/L Final    AST 07/23/2025 20  10 -  "35 U/L Final    ALT 07/23/2025 15  9 - 46 U/L Final    HEMOGLOBIN A1c 07/23/2025 5.8 (H)  <5.7 % Final    Comment: For someone without known diabetes, a hemoglobin   A1c value between 5.7% and 6.4% is consistent with  prediabetes and should be confirmed with a   follow-up test.     For someone with known diabetes, a value <7%  indicates that their diabetes is well controlled. A1c  targets should be individualized based on duration of  diabetes, age, comorbid conditions, and other  considerations.     This assay result is consistent with an increased risk  of diabetes.     Currently, no consensus exists regarding use of  hemoglobin A1c for diagnosis of diabetes for children.         eAG (mg/dL) 07/23/2025 120  mg/dL Final    eAG (mmol/L) 07/23/2025 6.6  mmol/L Final       Assessment/Plan   Problem List Items Addressed This Visit       Essential hypertension    Well-controlled, continue current medications and management.   Dietary sodium restriction.  Regular aerobic exercise program is recommended to help achieve and maintain normal body weight, fitness and improve lipid balance. .  Dietary changes: Increase soluble fiber  Plant sterols 2grams per day (e.g. Benecol)  Reduce saturated fat, \"trans\" monounsaturated fatty acids, and cholesterol             Relevant Orders    Follow Up In [facility] - PCP    Mixed hyperlipidemia    Well-controlled, continue current medications and management.   The nature of cardiac risk has been fully discussed with this patient. Discussed cardiovascular risk analysis and appropriate diet with the need for lifelong measures to reduce the risk. A regular exercise program is recommended to help achieve and maintain normal body weight, fitness and improve lipid balance. Patient education provided. They understand and agree with this course of treatment. They will return with new or worsening symptoms. Patient instructed to remain current with appropriate annual health " maintenance.            Relevant Orders    Lipid Panel    Follow Up In [facility] - PCP    Type 2 diabetes mellitus without complication, without long-term current use of insulin    Diabetes Mellitus type II, under good control.  1. Rx changes:   2. Education: Reviewed ‘ABCs’ of diabetes management (respective goals in parentheses):  A1C (<7), blood pressure (<130/80), and cholesterol (LDL <100).  3. Compliance at present is estimated to be good. Efforts to improve compliance (if necessary) will be directed at dietary modifications: and increased exercise.  4. Follow up: 5 months with fasting labs prior.            Relevant Medications    semaglutide (Ozempic) 2 mg/dose (8 mg/3 mL) pen injector    Other Relevant Orders    CBC and Auto Differential    Comprehensive Metabolic Panel    Hemoglobin A1C    Follow Up In [facility] - PCP    Annual physical exam - Primary     Other Visit Diagnoses         Screening for malignant neoplasm of prostate        Relevant Orders    Prostate Specific Antigen             Scribe Attestation  By signing my name below, I, Crystal Pack, Scribe   attest that this documentation has been prepared under the direction and in the presence of Usama Giang MD . Dictation #1  MRN:[id number]  CSN:[id number]

## 2025-08-06 NOTE — ASSESSMENT & PLAN NOTE
Diabetes Mellitus type II, under good control.  1. Rx changes:   2. Education: Reviewed ‘ABCs’ of diabetes management (respective goals in parentheses):  A1C (<7), blood pressure (<130/80), and cholesterol (LDL <100).  3. Compliance at present is estimated to be good. Efforts to improve compliance (if necessary) will be directed at dietary modifications: and increased exercise.  4. Follow up: 5 months with fasting labs prior.

## 2025-08-11 ENCOUNTER — APPOINTMENT (OUTPATIENT)
Dept: PRIMARY CARE | Facility: CLINIC | Age: 65
End: 2025-08-11
Payer: MEDICARE

## 2025-08-13 ENCOUNTER — APPOINTMENT (OUTPATIENT)
Facility: CLINIC | Age: 65
End: 2025-08-13
Payer: MEDICARE

## 2025-08-13 VITALS
RESPIRATION RATE: 18 BRPM | WEIGHT: 275.2 LBS | SYSTOLIC BLOOD PRESSURE: 108 MMHG | HEIGHT: 72 IN | DIASTOLIC BLOOD PRESSURE: 73 MMHG | OXYGEN SATURATION: 94 % | HEART RATE: 84 BPM | BODY MASS INDEX: 37.27 KG/M2

## 2025-08-13 DIAGNOSIS — G47.33 OSA TREATED WITH BIPAP: ICD-10-CM

## 2025-08-13 DIAGNOSIS — J96.11 CHRONIC RESPIRATORY FAILURE WITH HYPOXIA: ICD-10-CM

## 2025-08-13 DIAGNOSIS — J41.8 MIXED SIMPLE AND MUCOPURULENT CHRONIC BRONCHITIS (MULTI): Primary | ICD-10-CM

## 2025-08-13 PROCEDURE — 1159F MED LIST DOCD IN RCRD: CPT | Performed by: INTERNAL MEDICINE

## 2025-08-13 PROCEDURE — G8433 SCR FOR DEP NOT CPT DOC RSN: HCPCS | Performed by: INTERNAL MEDICINE

## 2025-08-13 PROCEDURE — 99214 OFFICE O/P EST MOD 30 MIN: CPT | Performed by: INTERNAL MEDICINE

## 2025-08-13 PROCEDURE — 3078F DIAST BP <80 MM HG: CPT | Performed by: INTERNAL MEDICINE

## 2025-08-13 PROCEDURE — 3008F BODY MASS INDEX DOCD: CPT | Performed by: INTERNAL MEDICINE

## 2025-08-13 PROCEDURE — 3074F SYST BP LT 130 MM HG: CPT | Performed by: INTERNAL MEDICINE

## 2025-08-13 ASSESSMENT — COPD QUESTIONNAIRES
QUESTION3_CHESTTIGHTNESS: 1
QUESTION4_WALKINCLINE: 2
QUESTION2_CHESTPHLEGM: 0 - I HAVE NO PHLEGM (MUCUS) IN MY CHEST AT ALL
QUESTION7_SLEEPQUALITY: 0 - I SLEEP SOUNDLY
CAT_TOTALSCORE: 6
QUESTION8_ENERGYLEVEL: 0 - I HAVE LOTS OF ENERGY
QUESTION1_COUGHFREQUENCY: 0 - I NEVER COUGH
QUESTION5_HOMEACTIVITIES: 2
QUESTION6_LEAVINGHOUSE: 1

## 2025-08-13 ASSESSMENT — ENCOUNTER SYMPTOMS
DEPRESSION: 0
OCCASIONAL FEELINGS OF UNSTEADINESS: 0
LOSS OF SENSATION IN FEET: 1

## 2025-08-13 ASSESSMENT — COLUMBIA-SUICIDE SEVERITY RATING SCALE - C-SSRS
6. HAVE YOU EVER DONE ANYTHING, STARTED TO DO ANYTHING, OR PREPARED TO DO ANYTHING TO END YOUR LIFE?: NO
2. HAVE YOU ACTUALLY HAD ANY THOUGHTS OF KILLING YOURSELF?: NO
1. IN THE PAST MONTH, HAVE YOU WISHED YOU WERE DEAD OR WISHED YOU COULD GO TO SLEEP AND NOT WAKE UP?: NO

## 2026-02-10 ENCOUNTER — APPOINTMENT (OUTPATIENT)
Dept: PRIMARY CARE | Facility: CLINIC | Age: 66
End: 2026-02-10
Payer: MEDICARE

## 2026-05-14 ENCOUNTER — APPOINTMENT (OUTPATIENT)
Dept: CARDIOLOGY | Facility: CLINIC | Age: 66
End: 2026-05-14
Payer: MEDICARE

## 2026-08-13 ENCOUNTER — APPOINTMENT (OUTPATIENT)
Facility: CLINIC | Age: 66
End: 2026-08-13
Payer: MEDICARE